# Patient Record
Sex: MALE | Race: WHITE | NOT HISPANIC OR LATINO | Employment: FULL TIME | URBAN - METROPOLITAN AREA
[De-identification: names, ages, dates, MRNs, and addresses within clinical notes are randomized per-mention and may not be internally consistent; named-entity substitution may affect disease eponyms.]

---

## 2022-01-14 ENCOUNTER — OFFICE VISIT (OUTPATIENT)
Dept: PRIMARY CARE CLINIC | Facility: CLINIC | Age: 27
End: 2022-01-14
Payer: COMMERCIAL

## 2022-01-14 VITALS
HEIGHT: 68 IN | BODY MASS INDEX: 21.73 KG/M2 | OXYGEN SATURATION: 100 % | HEART RATE: 68 BPM | DIASTOLIC BLOOD PRESSURE: 69 MMHG | SYSTOLIC BLOOD PRESSURE: 115 MMHG | WEIGHT: 143.38 LBS

## 2022-01-14 DIAGNOSIS — Z11.59 NEED FOR HEPATITIS C SCREENING TEST: ICD-10-CM

## 2022-01-14 DIAGNOSIS — Z02.83 ENCOUNTER FOR DRUG SCREENING: ICD-10-CM

## 2022-01-14 DIAGNOSIS — Z13.220 SCREENING FOR CHOLESTEROL LEVEL: ICD-10-CM

## 2022-01-14 DIAGNOSIS — Z00.00 ANNUAL PHYSICAL EXAM: ICD-10-CM

## 2022-01-14 DIAGNOSIS — F90.2 ATTENTION DEFICIT HYPERACTIVITY DISORDER (ADHD), COMBINED TYPE: Primary | ICD-10-CM

## 2022-01-14 DIAGNOSIS — Z13.29 THYROID DISORDER SCREEN: ICD-10-CM

## 2022-01-14 DIAGNOSIS — Z11.4 SCREENING FOR HIV (HUMAN IMMUNODEFICIENCY VIRUS): ICD-10-CM

## 2022-01-14 PROCEDURE — 1159F MED LIST DOCD IN RCRD: CPT | Mod: CPTII,S$GLB,, | Performed by: NURSE PRACTITIONER

## 2022-01-14 PROCEDURE — 3078F PR MOST RECENT DIASTOLIC BLOOD PRESSURE < 80 MM HG: ICD-10-PCS | Mod: CPTII,S$GLB,, | Performed by: NURSE PRACTITIONER

## 2022-01-14 PROCEDURE — 3078F DIAST BP <80 MM HG: CPT | Mod: CPTII,S$GLB,, | Performed by: NURSE PRACTITIONER

## 2022-01-14 PROCEDURE — 99385 PREV VISIT NEW AGE 18-39: CPT | Mod: S$GLB,,, | Performed by: NURSE PRACTITIONER

## 2022-01-14 PROCEDURE — 99385 PR PREVENTIVE VISIT,NEW,18-39: ICD-10-PCS | Mod: S$GLB,,, | Performed by: NURSE PRACTITIONER

## 2022-01-14 PROCEDURE — 3074F SYST BP LT 130 MM HG: CPT | Mod: CPTII,S$GLB,, | Performed by: NURSE PRACTITIONER

## 2022-01-14 PROCEDURE — 1160F PR REVIEW ALL MEDS BY PRESCRIBER/CLIN PHARMACIST DOCUMENTED: ICD-10-PCS | Mod: CPTII,S$GLB,, | Performed by: NURSE PRACTITIONER

## 2022-01-14 PROCEDURE — 3008F BODY MASS INDEX DOCD: CPT | Mod: CPTII,S$GLB,, | Performed by: NURSE PRACTITIONER

## 2022-01-14 PROCEDURE — 1160F RVW MEDS BY RX/DR IN RCRD: CPT | Mod: CPTII,S$GLB,, | Performed by: NURSE PRACTITIONER

## 2022-01-14 PROCEDURE — 3008F PR BODY MASS INDEX (BMI) DOCUMENTED: ICD-10-PCS | Mod: CPTII,S$GLB,, | Performed by: NURSE PRACTITIONER

## 2022-01-14 PROCEDURE — 3074F PR MOST RECENT SYSTOLIC BLOOD PRESSURE < 130 MM HG: ICD-10-PCS | Mod: CPTII,S$GLB,, | Performed by: NURSE PRACTITIONER

## 2022-01-14 PROCEDURE — 1159F PR MEDICATION LIST DOCUMENTED IN MEDICAL RECORD: ICD-10-PCS | Mod: CPTII,S$GLB,, | Performed by: NURSE PRACTITIONER

## 2022-01-14 RX ORDER — DEXTROAMPHETAMINE SACCHARATE, AMPHETAMINE ASPARTATE, DEXTROAMPHETAMINE SULFATE AND AMPHETAMINE SULFATE 2.5; 2.5; 2.5; 2.5 MG/1; MG/1; MG/1; MG/1
10 TABLET ORAL 2 TIMES DAILY
Qty: 60 TABLET | Refills: 0 | Status: SHIPPED | OUTPATIENT
Start: 2022-01-14 | End: 2022-01-18 | Stop reason: SDUPTHER

## 2022-01-14 NOTE — PROGRESS NOTES
Subjective:       Patient ID: Bhavik Malone is a 27 y.o. male.    Chief Complaint: Establish Care and ADHD (Pt is about to start school and is having trouble focusing and pt would like to be put o ADHD medication)    HPI: Bhavik is a 27 y.o. male who presents to establish care with a new PCP. Not seen by a PCP in several years.    Will be starting school for [employer] next week at [employer] [facility] [employer] and has trouble focusing and concentrating at work and at school. Becomes frustrated because he can not get task done. Used to take Adderall years ago and it helped him so he would like to start back on it.    Denies any previous health issues. Does not take any medications.    Smokes 1/2 ppd of cigarettes daily and has smoked for 8 years and only drinks socially.    Refuses all vaccines.            History reviewed. No pertinent past medical history.    History reviewed. No pertinent surgical history.    History reviewed. No pertinent family history.    Social History     Tobacco Use    Smoking status: Light Tobacco Smoker    Smokeless tobacco: Current User   Substance Use Topics    Alcohol use: Yes    Drug use: Never       There is no problem list on file for this patient.        There is no immunization history on file for this patient.        Review of Systems   Constitutional: Negative for activity change, appetite change, chills, diaphoresis, fatigue and fever.   HENT: Negative for congestion, ear pain, tinnitus and trouble swallowing.    Eyes: Negative for pain and visual disturbance.   Respiratory: Negative for cough, chest tightness, shortness of breath and wheezing.    Cardiovascular: Negative for chest pain, palpitations and leg swelling.   Gastrointestinal: Negative for abdominal distention, abdominal pain, blood in stool, constipation, diarrhea, nausea and vomiting.   Endocrine: Negative for cold intolerance, heat intolerance, polydipsia, polyphagia and polyuria.   Genitourinary: Negative for  "difficulty urinating, dysuria, frequency, hematuria and urgency.   Musculoskeletal: Negative for back pain, gait problem, neck pain and neck stiffness.   Skin: Negative for color change, pallor and rash.   Neurological: Negative for dizziness, speech difficulty, weakness, light-headedness, numbness and headaches.   Hematological: Negative for adenopathy. Does not bruise/bleed easily.   Psychiatric/Behavioral: Positive for decreased concentration. Negative for behavioral problems, confusion, sleep disturbance and suicidal ideas. The patient is nervous/anxious.      Objective:     Vitals:    01/14/22 1510   BP: 115/69   BP Location: Right arm   Patient Position: Sitting   BP Method: Large (Automatic)   Pulse: 68   SpO2: 100%   Weight: 65 kg (143 lb 6.4 oz)   Height: 5' 8" (1.727 m)       Physical Exam  Vitals and nursing note reviewed.   Constitutional:       General: He is not in acute distress.     Appearance: Normal appearance. He is not ill-appearing or diaphoretic.   HENT:      Head: Normocephalic and atraumatic.      Nose: Nose normal.      Mouth/Throat:      Mouth: Mucous membranes are moist.      Pharynx: Oropharynx is clear.   Eyes:      Extraocular Movements: Extraocular movements intact.      Conjunctiva/sclera: Conjunctivae normal.      Pupils: Pupils are equal, round, and reactive to light.   Neck:      Thyroid: No thyromegaly or thyroid tenderness.   Cardiovascular:      Rate and Rhythm: Normal rate and regular rhythm.      Pulses: Normal pulses.      Heart sounds: Normal heart sounds.   Pulmonary:      Effort: Pulmonary effort is normal.      Breath sounds: Normal breath sounds.   Abdominal:      General: Bowel sounds are normal. There is no distension.      Palpations: Abdomen is soft.      Tenderness: There is no abdominal tenderness.   Musculoskeletal:         General: No tenderness. Normal range of motion.      Cervical back: Normal range of motion.      Right lower leg: No edema.      Left lower " leg: No edema.   Lymphadenopathy:      Cervical: No cervical adenopathy.   Skin:     General: Skin is warm and dry.      Capillary Refill: Capillary refill takes less than 2 seconds.   Neurological:      General: No focal deficit present.      Mental Status: He is alert and oriented to person, place, and time.   Psychiatric:         Mood and Affect: Mood and affect normal.         Behavior: Behavior normal. Behavior is cooperative.         Thought Content: Thought content normal.         Judgment: Judgment normal.         No results found for any previous visit.         Assessment:      1. Attention deficit hyperactivity disorder (ADHD), combined type    2. Annual physical exam    3. Encounter for drug screening    4. Thyroid disorder screen    5. Screening for cholesterol level    6. Need for hepatitis C screening test    7. Screening for HIV (human immunodeficiency virus)          Plan:     Attention deficit hyperactivity disorder (ADHD), combined type  -     Discontinue: dextroamphetamine-amphetamine 10 mg Tab; Take 1 tablet (10 mg total) by mouth 2 (two) times a day.  Dispense: 60 tablet; Refill: 0    Annual physical exam  -     CBC Auto Differential; Future; Expected date: 01/19/2022  -     Comprehensive Metabolic Panel; Future; Expected date: 01/19/2022  -     Lipid Panel; Future; Expected date: 01/19/2022  -     TSH; Future; Expected date: 01/19/2022    Encounter for drug screening  -     Drugs of Abuse Screen, Blood; Future; Expected date: 01/19/2022    Thyroid disorder screen  -     TSH; Future; Expected date: 01/19/2022    Screening for cholesterol level  -     Lipid Panel; Future; Expected date: 01/19/2022    Need for hepatitis C screening test  -     Hepatitis C Antibody; Future; Expected date: 01/19/2022    Screening for HIV (human immunodeficiency virus)  -     HIV 1/2 Ag/Ab (4th Gen); Future; Expected date: 01/19/2022         Current Outpatient Medications   Medication Sig Dispense Refill     dextroamphetamine-amphetamine 10 mg Tab Take 1 tablet (10 mg total) by mouth 2 (two) times a day. 60 tablet 0     No current facility-administered medications for this visit.       There are no discontinued medications.    Health Maintenance   Topic Date Due    Hepatitis C Screening  Never done    Lipid Panel  Never done    TETANUS VACCINE  Never done       Patient Instructions   RTC in 3 months for F/U or sooner if needed.    Instructed patient to report to nearest ER or call 911 if begins to have difficulty breathing, turning blue, chest pain, B/P < 80/60 or >170/100, palpitations, syncope, extreme weakness, or severe H/A. Patient verbalized understanding.     Patient Education       Attention Deficit Hyperactivity Disorder (ADHD) Discharge Instructions   About this topic   Attention deficit hyperactivity disorder is also called ADHD or ADD. Most often, this starts at a young age. This disorder makes it hard to focus or sit still. People with ADHD may find it hard to make good decisions. Children with ADHD may have trouble in school and at home. Adults may have problems at work. People with ADHD may also have a problem getting along well with others. While there is no cure for ADHD, you and your doctor can work on a plan that is best for you to treat the signs of ADHD.  What care is needed at home?   · Ask your doctor what you need to do when you go home. Make sure you ask questions if you do not understand what the doctor says. This way you will know what you need to do.  · Try to get enough sleep at night. Most often adults need 7 to 8 hours each night and children need 8 to 10 hours each night. Rest during the day if you are tired.  · Eat and sleep at the same times every day.  · Have a plan for where to keep things in your home. Use checklists, things to help you remember, and alarms. A list of things you may need to find in a hurry can be helpful. These can help you put things in the right place and manage  your time.  · Work on getting better at reading and taking notes.  · Have a space that is just for work or homework so you will be able to pay attention. This may be an office or a desk facing a wall. Try using headphones so you cannot hear the other noises.  · Do one thing at a time. Keep a list of the next things you were planning to do or say.  · Break large jobs or things you have to do into smaller jobs. This will make them easier to finish. Reward yourself along the way.  · Have rules that are clear and easy to follow.   · Look at where you are the strongest. Give rewards for good behavior, finished schoolwork, or for doing a good job at work.  · Do not do too many things that might cause stress.  What follow-up care is needed?   · Your doctor may ask you to make visits to the office to check on your progress. Be sure to keep these visits.  · Your doctor may send you to a special mental health doctor. This person will talk with you about the problems you are having. Then, you can work together to find ways to help you manage them.  · Your doctor may suggest you try talk therapy to help you live well with your ADHD.  What drugs may be needed?   The doctor may order drugs to:  · Help lower your worry  · Help you to pay attention  · Help you be more calm  · Help you be less impulsive  · Help keep things in your life balanced  · Care for low mood  Will physical activity be limited?   Physical activity will help keep your mind and body in good shape. Talk with your doctor about the right amount of activity for you.  What problems could happen?   · Feeling badly about yourself  · Raise the chances of you hurting yourself, such as injury in a car accident  · Not do well in school and work  · Trouble making friends or getting along well with others  · Raise your chance to abuse alcohol or drugs  What can be done to prevent this health problem?   The cause of ADHD is not clear, but to lower the chance of your child  having ADHD:  · Do not drink beer, wine, or mixed drinks (alcohol) while you are pregnant.  · Do not smoke or use illegal drugs while you are pregnant.  · Keep your child away from things like harmful toxins and chemicals.  · Keep your child from having too much time in front of computers, TV, and video games.  · Get plenty of exercise.  · Be more aware of thoughts, emotions, and experiences each moment. This is mindfulness.  When do I need to call the doctor?   · Drugs are not working  · Symptoms are getting worse  Teach Back: Helping You Understand   The Teach Back Method helps you understand the information we are giving you. After you talk with the staff, tell them in your own words what you learned. This helps to make sure the staff has described each thing clearly. It also helps to explain things that may have been confusing. Before going home, make sure you can do these:  · I can tell you about my condition.  · I can tell you ways to help me pay attention.  · I can tell you what I will do if I think my drugs are not working.  Where can I learn more?   HelpGuide.org  http://www.helpguide.org/articles/add-adhd/attention-deficit-disorder-adhd-parenting-tips.htm   KidsHealth  http://kidshealth.org/parent/medical/learning/adhd.html   [place] [employer]  http://www.nimh.nih.gov/health/topics/attention-deficit-hyperactivity-disorder-adhd/index.shtml   Last Reviewed Date   2020-06-14  Consumer Information Use and Disclaimer   This information is not specific medical advice and does not replace information you receive from your health care provider. This is only a brief summary of general information. It does NOT include all information about conditions, illnesses, injuries, tests, procedures, treatments, therapies, discharge instructions or life-style choices that may apply to you. You must talk with your health care provider for complete information about your health and treatment options. This  information should not be used to decide whether or not to accept your health care providers advice, instructions or recommendations. Only your health care provider has the knowledge and training to provide advice that is right for you.  Copyright   Copyright © 2021 UpToDate, Inc. and its affiliates and/or licensors. All rights reserved.    Patient Education       Yearly Physical for Adults   About this topic   Most people do not want to be sick. Having a checkup each year with your doctor is one way to help you stay healthy. You may need to see your doctor more or less often. How often you need to go to the doctor depends on your age. Your family and medical history also play a role in how often you need to go to the doctor. Going to see your doctor on a routine basis can help you find problems early or even before they start. This may make it easier to treat or cure your problem.  General   Your doctor will talk about many things during your checkup. Your doctor may ask about:  · Your medical and family history.  · All the drugs you are taking. Be sure to include all prescription, over the counter, and herbal supplements. Tell the doctor if you have any drug allergy. Bring a list of drugs you take with you.  · How you are feeling and if you are having any problems.  · Risky behaviors like smoking, drinking alcohol, using illegal drugs, not wearing seatbelts, having unprotected sex, etc.  Your doctor will do a physical exam and may check your:  · Height and weight  · Blood pressure  · Reflexes  · Memory  · Vision  · Hearing  Your doctor may order:  · Lab tests  · ECG to check your heart rhythm  · X-rays  · Tests or treatments based on your exam  What lifestyle changes are needed?   Your doctor may suggest you make changes to your lifestyle at this visit. The doctor may talk with you about being more active or lowering stress levels. Ask your doctor what you need to do.  What drugs may be needed?   Your doctor may  order drugs or vaccines to protect you from illnesses.  What changes to diet are needed?   Talk to your doctor to see if any changes are needed to your diet.  When do I need to call the doctor?   Call your doctor if you need to learn about any test results. Together you can make a plan for more care.  Helpful tips   · Make a list of questions for your doctor before you go. This will help you remember to ask about any concerns. Write down any answers from your doctor so you can look over them after your visit.   · Tell your doctor about any changes in your body or health since your last visit.  · Ask your doctor about any screening tests you need.  Where can I learn more?   American Academy of Family Physicians  http://familydoctor.org/familydoctor/en/prevention-wellness/staying-healthy/healthy-living/preventive-services-for-healthy-living.printerview.html   Centers for Disease Control  http://www.cdc.gov/family/checkup/   Last Reviewed Date   2019-04-22  Consumer Information Use and Disclaimer   This information is not specific medical advice and does not replace information you receive from your health care provider. This is only a brief summary of general information. It does NOT include all information about conditions, illnesses, injuries, tests, procedures, treatments, therapies, discharge instructions or life-style choices that may apply to you. You must talk with your health care provider for complete information about your health and treatment options. This information should not be used to decide whether or not to accept your health care providers advice, instructions or recommendations. Only your health care provider has the knowledge and training to provide advice that is right for you.  Copyright   Copyright © 2021 UpToDate, Inc. and its affiliates and/or licensors. All rights reserved.          Risks, benefits, and alternatives discussed with patient, Patient verbalized understanding of discussed plan  of care. Asked patient if any further questions, answered no.    Future Appointments   Date Time Provider Department Center   4/8/2022  2:20 PM [facility]

## 2022-01-14 NOTE — PATIENT INSTRUCTIONS
RTC in 3 months for F/U or sooner if needed.    Instructed patient to report to nearest ER or call 911 if begins to have difficulty breathing, turning blue, chest pain, B/P < 80/60 or >170/100, palpitations, syncope, extreme weakness, or severe H/A. Patient verbalized understanding.     Patient Education       Attention Deficit Hyperactivity Disorder (ADHD) Discharge Instructions   About this topic   Attention deficit hyperactivity disorder is also called ADHD or ADD. Most often, this starts at a young age. This disorder makes it hard to focus or sit still. People with ADHD may find it hard to make good decisions. Children with ADHD may have trouble in school and at home. Adults may have problems at work. People with ADHD may also have a problem getting along well with others. While there is no cure for ADHD, you and your doctor can work on a plan that is best for you to treat the signs of ADHD.  What care is needed at home?   · Ask your doctor what you need to do when you go home. Make sure you ask questions if you do not understand what the doctor says. This way you will know what you need to do.  · Try to get enough sleep at night. Most often adults need 7 to 8 hours each night and children need 8 to 10 hours each night. Rest during the day if you are tired.  · Eat and sleep at the same times every day.  · Have a plan for where to keep things in your home. Use checklists, things to help you remember, and alarms. A list of things you may need to find in a hurry can be helpful. These can help you put things in the right place and manage your time.  · Work on getting better at reading and taking notes.  · Have a space that is just for work or homework so you will be able to pay attention. This may be an office or a desk facing a wall. Try using headphones so you cannot hear the other noises.  · Do one thing at a time. Keep a list of the next things you were planning to do or say.  · Break large jobs or things you  have to do into smaller jobs. This will make them easier to finish. Reward yourself along the way.  · Have rules that are clear and easy to follow.   · Look at where you are the strongest. Give rewards for good behavior, finished schoolwork, or for doing a good job at work.  · Do not do too many things that might cause stress.  What follow-up care is needed?   · Your doctor may ask you to make visits to the office to check on your progress. Be sure to keep these visits.  · Your doctor may send you to a special mental health doctor. This person will talk with you about the problems you are having. Then, you can work together to find ways to help you manage them.  · Your doctor may suggest you try talk therapy to help you live well with your ADHD.  What drugs may be needed?   The doctor may order drugs to:  · Help lower your worry  · Help you to pay attention  · Help you be more calm  · Help you be less impulsive  · Help keep things in your life balanced  · Care for low mood  Will physical activity be limited?   Physical activity will help keep your mind and body in good shape. Talk with your doctor about the right amount of activity for you.  What problems could happen?   · Feeling badly about yourself  · Raise the chances of you hurting yourself, such as injury in a car accident  · Not do well in school and work  · Trouble making friends or getting along well with others  · Raise your chance to abuse alcohol or drugs  What can be done to prevent this health problem?   The cause of ADHD is not clear, but to lower the chance of your child having ADHD:  · Do not drink beer, wine, or mixed drinks (alcohol) while you are pregnant.  · Do not smoke or use illegal drugs while you are pregnant.  · Keep your child away from things like harmful toxins and chemicals.  · Keep your child from having too much time in front of computers, TV, and video games.  · Get plenty of exercise.  · Be more aware of thoughts, emotions, and  experiences each moment. This is mindfulness.  When do I need to call the doctor?   · Drugs are not working  · Symptoms are getting worse  Teach Back: Helping You Understand   The Teach Back Method helps you understand the information we are giving you. After you talk with the staff, tell them in your own words what you learned. This helps to make sure the staff has described each thing clearly. It also helps to explain things that may have been confusing. Before going home, make sure you can do these:  · I can tell you about my condition.  · I can tell you ways to help me pay attention.  · I can tell you what I will do if I think my drugs are not working.  Where can I learn more?   HelpGuide.org  http://www.helpguide.org/articles/add-adhd/attention-deficit-disorder-adhd-parenting-tips.htm   KidsHealth  http://kidshealth.org/parent/medical/learning/adhd.html   [employer] [place] [employer]  http://www.[facility].nih.gov/health/topics/attention-deficit-hyperactivity-disorder-adhd/index.shtml   Last Reviewed Date   2020-06-14  Consumer Information Use and Disclaimer   This information is not specific medical advice and does not replace information you receive from your health care provider. This is only a brief summary of general information. It does NOT include all information about conditions, illnesses, injuries, tests, procedures, treatments, therapies, discharge instructions or life-style choices that may apply to you. You must talk with your health care provider for complete information about your health and treatment options. This information should not be used to decide whether or not to accept your health care providers advice, instructions or recommendations. Only your health care provider has the knowledge and training to provide advice that is right for you.  Copyright   Copyright © 2021 [employer]. and its affiliates and/or licensors. All rights reserved.    Patient Education       Yearly Physical for  Adults   About this topic   Most people do not want to be sick. Having a checkup each year with your doctor is one way to help you stay healthy. You may need to see your doctor more or less often. How often you need to go to the doctor depends on your age. Your family and medical history also play a role in how often you need to go to the doctor. Going to see your doctor on a routine basis can help you find problems early or even before they start. This may make it easier to treat or cure your problem.  General   Your doctor will talk about many things during your checkup. Your doctor may ask about:  · Your medical and family history.  · All the drugs you are taking. Be sure to include all prescription, over the counter, and herbal supplements. Tell the doctor if you have any drug allergy. Bring a list of drugs you take with you.  · How you are feeling and if you are having any problems.  · Risky behaviors like smoking, drinking alcohol, using illegal drugs, not wearing seatbelts, having unprotected sex, etc.  Your doctor will do a physical exam and may check your:  · Height and weight  · Blood pressure  · Reflexes  · Memory  · Vision  · Hearing  Your doctor may order:  · Lab tests  · ECG to check your heart rhythm  · X-rays  · Tests or treatments based on your exam  What lifestyle changes are needed?   Your doctor may suggest you make changes to your lifestyle at this visit. The doctor may talk with you about being more active or lowering stress levels. Ask your doctor what you need to do.  What drugs may be needed?   Your doctor may order drugs or vaccines to protect you from illnesses.  What changes to diet are needed?   Talk to your doctor to see if any changes are needed to your diet.  When do I need to call the doctor?   Call your doctor if you need to learn about any test results. Together you can make a plan for more care.  Helpful tips   · Make a list of questions for your doctor before you go. This will  help you remember to ask about any concerns. Write down any answers from your doctor so you can look over them after your visit.   · Tell your doctor about any changes in your body or health since your last visit.  · Ask your doctor about any screening tests you need.  Where can I learn more?   American Academy of [employer]icians  http://familydoctor.org/familydoctor/en/prevention-wellness/staying-healthy/healthy-living/preventive-services-for-healthy-living.printerview.html   Centers for Disease Control  http://www.cdc.gov/family/checkup/   Last Reviewed Date   2019-04-22  Consumer Information Use and Disclaimer   This information is not specific medical advice and does not replace information you receive from your health care provider. This is only a brief summary of general information. It does NOT include all information about conditions, illnesses, injuries, tests, procedures, treatments, therapies, discharge instructions or life-style choices that may apply to you. You must talk with your health care provider for complete information about your health and treatment options. This information should not be used to decide whether or not to accept your health care providers advice, instructions or recommendations. Only your health care provider has the knowledge and training to provide advice that is right for you.  Copyright   Copyright © 2021 [employer]. and its affiliates and/or licensors. All rights reserved.

## 2022-01-18 DIAGNOSIS — F90.2 ATTENTION DEFICIT HYPERACTIVITY DISORDER (ADHD), COMBINED TYPE: ICD-10-CM

## 2022-01-18 RX ORDER — DEXTROAMPHETAMINE SACCHARATE, AMPHETAMINE ASPARTATE, DEXTROAMPHETAMINE SULFATE AND AMPHETAMINE SULFATE 2.5; 2.5; 2.5; 2.5 MG/1; MG/1; MG/1; MG/1
10 TABLET ORAL 2 TIMES DAILY
Qty: 60 TABLET | Refills: 0 | Status: SHIPPED | OUTPATIENT
Start: 2022-01-18 | End: 2022-02-17 | Stop reason: SDUPTHER

## 2022-01-18 NOTE — TELEPHONE ENCOUNTER
----- Message from Adriana Lisa sent at 1/18/2022  9:25 AM CST -----  Contact: wife  Pt calling for refill on dextroamphetamine-amphetamine 10 mg Tab Pt can be reached at [phone] Script was with [facility] but they are out and pt would like to change to [facility]  [address] [place] [id number]  Phone: [phone]

## 2022-02-17 DIAGNOSIS — F90.2 ATTENTION DEFICIT HYPERACTIVITY DISORDER (ADHD), COMBINED TYPE: ICD-10-CM

## 2022-02-17 RX ORDER — DEXTROAMPHETAMINE SACCHARATE, AMPHETAMINE ASPARTATE, DEXTROAMPHETAMINE SULFATE AND AMPHETAMINE SULFATE 2.5; 2.5; 2.5; 2.5 MG/1; MG/1; MG/1; MG/1
10 TABLET ORAL 2 TIMES DAILY
Qty: 60 TABLET | Refills: 0 | Status: SHIPPED | OUTPATIENT
Start: 2022-02-17 | End: 2022-02-18 | Stop reason: SDUPTHER

## 2022-02-17 NOTE — TELEPHONE ENCOUNTER
----- Message from Meronpalmira Chin sent at 2/17/2022 12:03 PM CST -----  Type:  RX Refill Request    Who Called: Bhavik Malone    Refill or New Rx: refill   RX Name and Strength:dextroamphetamine-amphetamine 10 mg Tab  How is the patient currently taking it? (ex. 1XDay): 2Xday   Is this a 30 day or 90 day RX:30  Preferred Pharmacy with phone number:     [id number] [place] [id number] [address] [address]  Phone: [phone] Fax: [phone]      Local or Mail Order: Local   Ordering Provider:Nikita   Would the patient rather a call back or a response via [facility]?    Best Call Back Number:[phone]    Additional Information: also needs to speak w/ office about a bill he received

## 2022-02-18 DIAGNOSIS — F90.2 ATTENTION DEFICIT HYPERACTIVITY DISORDER (ADHD), COMBINED TYPE: ICD-10-CM

## 2022-02-18 RX ORDER — DEXTROAMPHETAMINE SACCHARATE, AMPHETAMINE ASPARTATE, DEXTROAMPHETAMINE SULFATE AND AMPHETAMINE SULFATE 2.5; 2.5; 2.5; 2.5 MG/1; MG/1; MG/1; MG/1
10 TABLET ORAL 2 TIMES DAILY
Qty: 60 TABLET | Refills: 0 | Status: SHIPPED | OUTPATIENT
Start: 2022-02-18 | End: 2022-03-18 | Stop reason: SDUPTHER

## 2022-03-18 DIAGNOSIS — F90.2 ATTENTION DEFICIT HYPERACTIVITY DISORDER (ADHD), COMBINED TYPE: ICD-10-CM

## 2022-03-18 RX ORDER — DEXTROAMPHETAMINE SACCHARATE, AMPHETAMINE ASPARTATE, DEXTROAMPHETAMINE SULFATE AND AMPHETAMINE SULFATE 2.5; 2.5; 2.5; 2.5 MG/1; MG/1; MG/1; MG/1
10 TABLET ORAL 2 TIMES DAILY
Qty: 60 TABLET | Refills: 0 | Status: SHIPPED | OUTPATIENT
Start: 2022-03-18 | End: 2022-04-08

## 2022-03-18 NOTE — TELEPHONE ENCOUNTER
----- Message from Eli Swift sent at 3/18/2022  8:48 AM CDT -----  Contact: PT  .Type:  RX Refill Request    Who Called:  Bhavik    Refill or New Rx: refill    RX Name and Strength: dextroamphetamine-amphetamine 10 mg Tab     How is the patient currently taking it? (ex. 1XDay):   Is this a 30 day or 90 day RX   Preferred Pharmacy with phone number: .    [id number] [place] [address] [address]  Phone: [phone] Fax: [phone]        Local or Mail Order: local   Ordering Provider:   Would the patient rather a call back or a response via [facility]?  Callback    Best Call Back Number: .[phone] (home)        Additional Information:

## 2022-04-08 ENCOUNTER — OFFICE VISIT (OUTPATIENT)
Dept: PRIMARY CARE CLINIC | Facility: CLINIC | Age: 27
End: 2022-04-08
Payer: COMMERCIAL

## 2022-04-08 VITALS
HEART RATE: 82 BPM | WEIGHT: 146 LBS | BODY MASS INDEX: 22.13 KG/M2 | DIASTOLIC BLOOD PRESSURE: 70 MMHG | SYSTOLIC BLOOD PRESSURE: 133 MMHG | OXYGEN SATURATION: 99 % | HEIGHT: 68 IN

## 2022-04-08 DIAGNOSIS — F90.2 ATTENTION DEFICIT HYPERACTIVITY DISORDER (ADHD), COMBINED TYPE: ICD-10-CM

## 2022-04-08 DIAGNOSIS — K64.8 OTHER HEMORRHOIDS: Primary | ICD-10-CM

## 2022-04-08 PROCEDURE — 99214 PR OFFICE/OUTPT VISIT, EST, LEVL IV, 30-39 MIN: ICD-10-PCS | Mod: S$GLB,,, | Performed by: NURSE PRACTITIONER

## 2022-04-08 PROCEDURE — 1160F RVW MEDS BY RX/DR IN RCRD: CPT | Mod: CPTII,S$GLB,, | Performed by: NURSE PRACTITIONER

## 2022-04-08 PROCEDURE — 3078F DIAST BP <80 MM HG: CPT | Mod: CPTII,S$GLB,, | Performed by: NURSE PRACTITIONER

## 2022-04-08 PROCEDURE — 3078F PR MOST RECENT DIASTOLIC BLOOD PRESSURE < 80 MM HG: ICD-10-PCS | Mod: CPTII,S$GLB,, | Performed by: NURSE PRACTITIONER

## 2022-04-08 PROCEDURE — 3008F PR BODY MASS INDEX (BMI) DOCUMENTED: ICD-10-PCS | Mod: CPTII,S$GLB,, | Performed by: NURSE PRACTITIONER

## 2022-04-08 PROCEDURE — 3008F BODY MASS INDEX DOCD: CPT | Mod: CPTII,S$GLB,, | Performed by: NURSE PRACTITIONER

## 2022-04-08 PROCEDURE — 3075F SYST BP GE 130 - 139MM HG: CPT | Mod: CPTII,S$GLB,, | Performed by: NURSE PRACTITIONER

## 2022-04-08 PROCEDURE — 1159F MED LIST DOCD IN RCRD: CPT | Mod: CPTII,S$GLB,, | Performed by: NURSE PRACTITIONER

## 2022-04-08 PROCEDURE — 1160F PR REVIEW ALL MEDS BY PRESCRIBER/CLIN PHARMACIST DOCUMENTED: ICD-10-PCS | Mod: CPTII,S$GLB,, | Performed by: NURSE PRACTITIONER

## 2022-04-08 PROCEDURE — 3075F PR MOST RECENT SYSTOLIC BLOOD PRESS GE 130-139MM HG: ICD-10-PCS | Mod: CPTII,S$GLB,, | Performed by: NURSE PRACTITIONER

## 2022-04-08 PROCEDURE — 1159F PR MEDICATION LIST DOCUMENTED IN MEDICAL RECORD: ICD-10-PCS | Mod: CPTII,S$GLB,, | Performed by: NURSE PRACTITIONER

## 2022-04-08 PROCEDURE — 99214 OFFICE O/P EST MOD 30 MIN: CPT | Mod: S$GLB,,, | Performed by: NURSE PRACTITIONER

## 2022-04-08 RX ORDER — DEXTROAMPHETAMINE SACCHARATE, AMPHETAMINE ASPARTATE, DEXTROAMPHETAMINE SULFATE AND AMPHETAMINE SULFATE 5; 5; 5; 5 MG/1; MG/1; MG/1; MG/1
1 TABLET ORAL 2 TIMES DAILY
Qty: 60 TABLET | Refills: 0 | Status: SHIPPED | OUTPATIENT
Start: 2022-04-08 | End: 2022-05-06 | Stop reason: SDUPTHER

## 2022-04-08 RX ORDER — DEXTROAMPHETAMINE SACCHARATE, AMPHETAMINE ASPARTATE, DEXTROAMPHETAMINE SULFATE AND AMPHETAMINE SULFATE 5; 5; 5; 5 MG/1; MG/1; MG/1; MG/1
1 TABLET ORAL 2 TIMES DAILY
Qty: 60 TABLET | Refills: 0 | Status: SHIPPED | OUTPATIENT
Start: 2022-05-06 | End: 2022-07-07 | Stop reason: SDUPTHER

## 2022-04-08 RX ORDER — DEXTROAMPHETAMINE SACCHARATE, AMPHETAMINE ASPARTATE, DEXTROAMPHETAMINE SULFATE AND AMPHETAMINE SULFATE 5; 5; 5; 5 MG/1; MG/1; MG/1; MG/1
1 TABLET ORAL 2 TIMES DAILY
Qty: 60 TABLET | Refills: 0 | Status: SHIPPED | OUTPATIENT
Start: 2022-06-03 | End: 2022-08-05 | Stop reason: SDUPTHER

## 2022-04-08 RX ORDER — DEXTROAMPHETAMINE SACCHARATE, AMPHETAMINE ASPARTATE, DEXTROAMPHETAMINE SULFATE AND AMPHETAMINE SULFATE 2.5; 2.5; 2.5; 2.5 MG/1; MG/1; MG/1; MG/1
10 TABLET ORAL 2 TIMES DAILY
Qty: 60 TABLET | Refills: 0 | Status: CANCELLED | OUTPATIENT
Start: 2022-04-08

## 2022-04-08 NOTE — PROGRESS NOTES
Subjective:       Patient ID: Bhavik Malone is a 27 y.o. male.    Chief Complaint: Follow-up    HPI: Bhavik is a 27 y.o. male who presents for F/U.    Feels well today with no new issues or concerns.    ADHD - Started on Adderall 10 mg BID at his last visit and it is working well for him but is not lasting throughout the day so he would like to increase the dose.    He does have a history of hemorrhoids which he uses preparation H and has started eating a high fiber diet.    Smokes 1/2 ppd of cigarettes daily.    Refuses all vaccines.                History reviewed. No pertinent past medical history.    History reviewed. No pertinent surgical history.    History reviewed. No pertinent family history.    Social History     Tobacco Use    Smoking status: Light Tobacco Smoker    Smokeless tobacco: Current User   Substance Use Topics    Alcohol use: Yes    Drug use: Never       Patient Active Problem List   Diagnosis    Attention deficit hyperactivity disorder (ADHD), combined type    Other hemorrhoids         There is no immunization history on file for this patient.        Review of Systems   Constitutional: Negative for activity change, appetite change, chills, diaphoresis, fatigue and fever.   HENT: Negative for tinnitus and trouble swallowing.    Eyes: Negative for visual disturbance.   Respiratory: Negative for cough, chest tightness, shortness of breath and wheezing.    Cardiovascular: Negative for chest pain, palpitations and leg swelling.   Gastrointestinal: Negative for abdominal distention, abdominal pain, blood in stool, constipation, diarrhea, nausea and vomiting.   Genitourinary: Negative for difficulty urinating, dysuria, frequency, hematuria and urgency.   Musculoskeletal: Negative for back pain, gait problem, neck pain and neck stiffness.   Skin: Negative for color change, pallor and rash.   Neurological: Negative for dizziness, speech difficulty, weakness, light-headedness, numbness and  "headaches.   Psychiatric/Behavioral: Positive for decreased concentration. Negative for behavioral problems, confusion, sleep disturbance and suicidal ideas. The patient is nervous/anxious.      Objective:     Vitals:    04/08/22 1418   BP: 133/70   BP Location: Left arm   Patient Position: Sitting   BP Method: Medium (Automatic)   Pulse: 82   SpO2: 99%   Weight: 66.2 kg (146 lb)   Height: 5' 8" (1.727 m)       Physical Exam  Vitals and nursing note reviewed.   Constitutional:       General: He is not in acute distress.     Appearance: Normal appearance. He is not ill-appearing or diaphoretic.   HENT:      Head: Normocephalic and atraumatic.      Nose: Nose normal.      Mouth/Throat:      Mouth: Mucous membranes are moist.      Pharynx: Oropharynx is clear.   Eyes:      Extraocular Movements: Extraocular movements intact.      Conjunctiva/sclera: Conjunctivae normal.      Pupils: Pupils are equal, round, and reactive to light.   Cardiovascular:      Rate and Rhythm: Normal rate and regular rhythm.      Pulses: Normal pulses.      Heart sounds: Normal heart sounds.   Pulmonary:      Effort: Pulmonary effort is normal.      Breath sounds: Normal breath sounds.   Abdominal:      General: There is no distension.      Tenderness: There is no abdominal tenderness.   Musculoskeletal:         General: No tenderness. Normal range of motion.      Cervical back: Normal range of motion.      Right lower leg: No edema.      Left lower leg: No edema.   Lymphadenopathy:      Cervical: No cervical adenopathy.   Skin:     General: Skin is warm and dry.      Capillary Refill: Capillary refill takes less than 2 seconds.   Neurological:      Mental Status: He is alert and oriented to person, place, and time.   Psychiatric:         Mood and Affect: Mood and affect normal.         Behavior: Behavior normal. Behavior is cooperative.         No results found for any previous visit.         Assessment:      1. Other hemorrhoids    2. " Attention deficit hyperactivity disorder (ADHD), combined type Stable         Plan:     Other hemorrhoids    Attention deficit hyperactivity disorder (ADHD), combined type  Comments:  Adderall refilled, F/U in 3 months  Orders:  -     dextroamphetamine-amphetamine (ADDERALL) 20 mg tablet; Take 1 tablet by mouth 2 (two) times daily.  Dispense: 60 tablet; Refill: 0  -     dextroamphetamine-amphetamine (ADDERALL) 20 mg tablet; Take 1 tablet by mouth 2 (two) times daily.  Dispense: 60 tablet; Refill: 0  -     dextroamphetamine-amphetamine (ADDERALL) 20 mg tablet; Take 1 tablet by mouth 2 (two) times daily.  Dispense: 60 tablet; Refill: 0         Current Outpatient Medications   Medication Sig Dispense Refill    dextroamphetamine-amphetamine (ADDERALL) 20 mg tablet Take 1 tablet by mouth 2 (two) times daily. 60 tablet 0    [START ON 5/6/2022] dextroamphetamine-amphetamine (ADDERALL) 20 mg tablet Take 1 tablet by mouth 2 (two) times daily. 60 tablet 0    [START ON 6/3/2022] dextroamphetamine-amphetamine (ADDERALL) 20 mg tablet Take 1 tablet by mouth 2 (two) times daily. 60 tablet 0     No current facility-administered medications for this visit.       Medications Discontinued During This Encounter   Medication Reason    dextroamphetamine-amphetamine 10 mg Tab Change in Dosage Form       Health Maintenance   Topic Date Due    Hepatitis C Screening  Never done    Lipid Panel  Never done    TETANUS VACCINE  Never done       Patient Instructions     RTC in 3 months for F/U or sooner if needed.    Keep appts with specialists as scheduled.    Instructed patient to report to nearest ER or call 911 if begins to have difficulty breathing, turning blue, chest pain, B/P < 80/60 or >170/100, palpitations, syncope, extreme weakness, or severe H/A. Patient verbalized understanding.      Patient Education       Hemorrhoids   The Basics   Written by the doctors and editors at [facility]   What are hemorrhoids? -- Hemorrhoids are  swollen veins in the rectum. They can cause itching, bleeding, and pain. Hemorrhoids are very common.  In some cases, you can see or feel hemorrhoids around the outside of the rectum. In other cases, you cannot see them because they are hidden inside the rectum (figure 1).  What are the symptoms of hemorrhoids? -- Hemorrhoids do not always cause symptoms. But when they do, symptoms can include:  · Itching of the skin around the anus  · Bleeding - Bleeding is usually painless. You might see bright red blood after using the toilet.  · Pain - If a blood clot forms inside a hemorrhoid, this can cause pain. It can also cause a lump that you might be able to feel.  Should I see a doctor or nurse? -- You should see a doctor or nurse if you have any bleeding or if your bowel movements look like tar. Bleeding could be caused by something other than hemorrhoids, so you should have it checked out.  If you do have hemorrhoids, your doctor or nurse can suggest treatments. But there some steps you can try on you your own first.  What can I do to keep from getting more hemorrhoids? -- The most important thing you can do is to keep from getting constipated. You should have a bowel movement at least a few times a week. When you have a bowel movement, you also should not have to push too much. Plus, your bowel movements should not be too hard.  Being constipated and having hard bowel movements can make hemorrhoids worse. Here are some steps you can take to avoid getting constipated or having hard stools:  · Eat lots of fruits, vegetables, and other foods with fiber (figure 2). Fiber helps to increase bowel movements.   You need 20 to 35 grams of fiber a day to keep your bowel movements regular (table 1). If you do not get enough fiber from your diet, you can take fiber supplements. These come in the form of powders, wafers, or pills. They include psyllium seed (sample brand names: Metamucil, Konsyl), methylcellulose (sample brand  "name: Citrucel), polycarbophil (sample brand name: FiberCon), and wheat dextrin (sample brand name: Benefiber). If you take a fiber supplement, be sure to read the label so you know how much to take. If you're not sure, ask your doctor nurse.  · Take medicines called "stool softeners" such as docusate sodium (sample brand names: Colace, Dulcolax). These medicines increase the number of bowel movements you have. They are safe to take and they can prevent problems later.  What can I do to reduce my symptoms? -- Some people feel better if they soak their buttocks in 2 or 3 inches of warm water. You can do this up to 2 to 3 times a day for 10 to 15 minutes. Do not add soap, bubble bath, or anything to the water.  There are also medicines that you can get without a prescription (table 2). They are usually creams or ointments that you rub on your anus to relieve pain, itching, and swelling. Some hemorrhoid medicines come in a capsule (called a suppository) that you put inside your rectum. Others come in a cream that comes in a bottle with a nozzle that you put inside your rectum. It is OK to try these medicines. But do not use medicines that have hydrocortisone (a steroid medicine) for more than a week, unless your doctor or nurse approves.  What if the self-care steps do not work? -- If you still have symptoms after trying the steps listed above, you might need treatments to destroy or remove the hemorrhoids.  One popular treatment for hemorrhoids inside the rectum is called "rubber band ligation." For this treatment, the doctor ties tiny rubber bands around the hemorrhoids. A few days later the hemorrhoids shrink and fall off. Doctors can also use lasers, heat, or chemicals to destroy hemorrhoids. But if none of these options works, your doctor might suggest surgery to remove the hemorrhoids. Hemorrhoids on the outside of the rectum can only be removed with surgery.  All topics are updated as new evidence becomes " "available and our peer review process is complete.  This topic retrieved from [employer] on: Sep 21, 2021.  Topic 25252 Version 13.0  Release: 29.4.2 - C29.263  © 2021 UpToDate, Inc. and/or its affiliates. All rights reserved.  figure 1: Hemorrhoids     Hemorrhoids that are hidden inside the rectum are called "internal" hemorrhoids. You cannot see them, but they can cause symptoms. Hemorrhoids that you can see or feel are called "external" hemorrhoids.  Graphic 75144 Version 2.0    figure 2: Foods with fiber     Foods with a lot of fiber include prunes, apples, oranges, bananas, peas, green beans, kidney beans, cooked oatmeal, almonds, peanuts, and whole-wheat bread.  Graphic 28454 Version 1.0    table 1: Amount of fiber in different foods  Food  Serving  Grams of fiber    Fruits    Apple (with skin) 1 medium apple 4.4   Banana 1 medium banana 3.1   Oranges 1 orange 3.1   Prunes 1 cup, pitted 12.4   Juices    Apple, unsweetened, with added ascorbic acid 1 cup 0.5   Grapefruit, white, canned, sweetened 1 cup 0.2   Grape, unsweetened, with added ascorbic acid 1 cup 0.5   Orange 1 cup 0.7   Vegetables    Cooked   · Green beans 1 cup 4.0   · Carrots 1/2 cup sliced 2.3   · Peas 1 cup 8.8   · Potato (baked, with skin) 1 medium potato 3.8   Raw   · Cucumber (with peel) 1 cucumber 1.5   · Lettuce 1 cup shredded 0.5   · Tomato 1 medium tomato 1.5   · Spinach 1 cup 0.7   Legumes   · Baked beans, canned, no salt added 1 cup 13.9   · Kidney beans, canned 1 cup 13.6   · Lima beans, canned 1 cup 11.6   · Lentils, boiled 1 cup 15.6   Breads, pastas, flours    Bran muffins 1 medium muffin 5.2   Oatmeal, cooked 1 cup 4.0   White bread 1 slice 0.6   Whole-wheat bread 1 slice 1.9   Pasta and rice, cooked   · Macaroni 1 cup 2.5   · Rice, brown 1 cup 3.5   · Rice, white 1 cup 0.6   · Spaghetti (regular) 1 cup 2.5   Nuts    Almonds 1/2 cup 8.7   Peanuts 1/2 cup 7.9   To learn how much fiber and other nutrients are in different foods, visit " the [place] Department of Agriculture ([employer]) [employer] website.  Graphic 01215 Version 6.0  table 2: Common at-home treatments for hemorrhoids  Type of medicine  Examples  Notes    Stool softener Docusate sodium (sample brand names: Colace, Docu, Stool Softener) Softens bowel movements  Helps avoid straining while sitting on toilet   Bulk-forming laxatives and fiber supplements Methylcellulose (sample brand names: Citrucel, Soluble Fiber Therapy)  Polycarbophil (sample brand names: FiberCon, Konsyl Fiber)  Psyllium (sample brand names: Metamucil, Konsyl)  Wheat dextrin (sample brand name: Benefiber) Prevent hard dry stools which make hemorrhoids worse  Might reduce bleeding and other hemorrhoid symptoms  Needs to be taken with plenty of water (8 glasses of water a day)  Your doctor might suggest starting with a small dose and then increasing over time   Pain relief Pramoxine rectal foam, ointment, or wipes (sample brand names: Proctofoam, Pramox) Can help with pain and itching  Area must be gently cleaned and allowed to dry before using   Medicines to dry or protect skin Witch hazel (sample brand names: Tucks, Preparation H pads, Preparation H wipes)  Zinc oxide topical paste (sample brand names: Audrey's Butt Paste, Desitin) May dry or tighten skin around the anus  Zinc oxide also protects skin from irritation  Area must be gently cleaned and allowed to dry before using  Can apply after a sitz bath (soaking in warm, shallow water)  Witch hazel wipes or unscented baby wipes can be used to clean the anus after a bowel movement   Steroid cream Hydrocortisone rectal cream (sample brand name: Preparation H hydrocortisone) Reduces swelling, and pain caused by hemorrhoids  Do not use for longer than a week  Do not use at all if you are pregnant unless your doctor or nurse tells you to   Medicines to shrink hemorrhoids Phenylephrine ointment or suppository (sample brand names: Preparation H, Rectacaine)  Phenylephrine shrinks swollen hemorrhoids, and relieves itching and discomfort for a few hours  Area must be gently cleaned and allowed to dry before applying   Numbing ointments Benzocaine rectal ointment (sample brand name: Americaine)  Dibucaine rectal ointment (sample brand name: Nupercainal)  Lidocaine rectal cream (sample brand name: RectiCare) Benzocaine, dibucaine, and lidocaine can help with pain and itching, but should not be used without talking to a doctor first. They should only be used once in a while, in small amounts.   · This table lists some examples of over-the-counter treatments for hemorrhoids. There are many more brand-name and generic versions available, too. Read all labels to make sure you're not using too much of one ingredient.  · Do not use over-the-counter treatments for more than one week without speaking to your doctor. They can damage your skin.  · Follow instructions carefully - for example, it's important to clean and dry your skin before using creams or ointments. You can use an unscented baby wipe to clean your anus after a bowel movement.  · If you have rectal bleeding, or if your bowel movements look like tar, see your doctor or nurse. These problems could be caused by something other than hemorrhoids. You should also see your doctor or nurse if your hemorrhoid symptoms still bother you after you have tried taking care of them yourself.  Graphic 807069 Version 7.0  Consumer Information Use and Disclaimer   This information is not specific medical advice and does not replace information you receive from your health care provider. This is only a brief summary of general information. It does NOT include all information about conditions, illnesses, injuries, tests, procedures, treatments, therapies, discharge instructions or life-style choices that may apply to you. You must talk with your health care provider for complete information about your health and treatment options. This  information should not be used to decide whether or not to accept your health care provider's advice, instructions or recommendations. Only your health care provider has the knowledge and training to provide advice that is right for you. The use of this information is governed by the [employer] End User License Agreement, available at https://www.Glio/en/solutions/Demo Lesson/about/armen.The use of [employer] content is governed by the [employer] Terms of Use. ©2021 UpToDate, Inc. All rights reserved.  Copyright   © 2021 UpToDate, Inc. and/or its affiliates. All rights reserved.    Patient Education       Attention Deficit Hyperactivity Disorder (ADHD) Discharge Instructions   About this topic   Attention deficit hyperactivity disorder is also called ADHD or ADD. Most often, this starts at a young age. This disorder makes it hard to focus or sit still. People with ADHD may find it hard to make good decisions. Children with ADHD may have trouble in school and at home. Adults may have problems at work. People with ADHD may also have a problem getting along well with others. While there is no cure for ADHD, you and your doctor can work on a plan that is best for you to treat the signs of ADHD.  What care is needed at home?   · Ask your doctor what you need to do when you go home. Make sure you ask questions if you do not understand what the doctor says. This way you will know what you need to do.  · Try to get enough sleep at night. Most often adults need 7 to 8 hours each night and children need 8 to 10 hours each night. Rest during the day if you are tired.  · Eat and sleep at the same times every day.  · Have a plan for where to keep things in your home. Use checklists, things to help you remember, and alarms. A list of things you may need to find in a hurry can be helpful. These can help you put things in the right place and manage your time.  · Work on getting better at reading and taking notes.  · Have a space  that is just for work or homework so you will be able to pay attention. This may be an office or a desk facing a wall. Try using headphones so you cannot hear the other noises.  · Do one thing at a time. Keep a list of the next things you were planning to do or say.  · Break large jobs or things you have to do into smaller jobs. This will make them easier to finish. Reward yourself along the way.  · Have rules that are clear and easy to follow.   · Look at where you are the strongest. Give rewards for good behavior, finished schoolwork, or for doing a good job at work.  · Do not do too many things that might cause stress.  What follow-up care is needed?   · Your doctor may ask you to make visits to the office to check on your progress. Be sure to keep these visits.  · Your doctor may send you to a special mental health doctor. This person will talk with you about the problems you are having. Then, you can work together to find ways to help you manage them.  · Your doctor may suggest you try talk therapy to help you live well with your ADHD.  What drugs may be needed?   The doctor may order drugs to:  · Help lower your worry  · Help you to pay attention  · Help you be more calm  · Help you be less impulsive  · Help keep things in your life balanced  · Care for low mood  Will physical activity be limited?   Physical activity will help keep your mind and body in good shape. Talk with your doctor about the right amount of activity for you.  What problems could happen?   · Feeling badly about yourself  · Raise the chances of you hurting yourself, such as injury in a car accident  · Not do well in school and work  · Trouble making friends or getting along well with others  · Raise your chance to abuse alcohol or drugs  What can be done to prevent this health problem?   The cause of ADHD is not clear, but to lower the chance of your child having ADHD:  · Do not drink beer, wine, or mixed drinks (alcohol) while you are  pregnant.  · Do not smoke or use illegal drugs while you are pregnant.  · Keep your child away from things like harmful toxins and chemicals.  · Keep your child from having too much time in front of computers, TV, and video games.  · Get plenty of exercise.  · Be more aware of thoughts, emotions, and experiences each moment. This is mindfulness.  When do I need to call the doctor?   · Drugs are not working  · Symptoms are getting worse  Teach Back: Helping You Understand   The Teach Back Method helps you understand the information we are giving you. After you talk with the staff, tell them in your own words what you learned. This helps to make sure the staff has described each thing clearly. It also helps to explain things that may have been confusing. Before going home, make sure you can do these:  · I can tell you about my condition.  · I can tell you ways to help me pay attention.  · I can tell you what I will do if I think my drugs are not working.  Where can I learn more?   HelpGuide.org  http://www.helpguide.org/articles/add-adhd/attention-deficit-disorder-adhd-parenting-tips.htm   KidsHealth  http://kidshealth.org/parent/medical/learning/adhd.html   [place] of Mental Health  http://www.nimh.nih.gov/health/topics/attention-deficit-hyperactivity-disorder-adhd/index.shtml   Last Reviewed Date   2020-06-14  Consumer Information Use and Disclaimer   This information is not specific medical advice and does not replace information you receive from your health care provider. This is only a brief summary of general information. It does NOT include all information about conditions, illnesses, injuries, tests, procedures, treatments, therapies, discharge instructions or life-style choices that may apply to you. You must talk with your health care provider for complete information about your health and treatment options. This information should not be used to decide whether or not to accept your health care  providers advice, instructions or recommendations. Only your health care provider has the knowledge and training to provide advice that is right for you.  Copyright   Copyright © 2021 UpToDate, Inc. and its affiliates and/or licensors. All rights reserved.        Risks, benefits, and alternatives discussed with patient, Patient verbalized understanding of discussed plan of care. Asked patient if any further questions, answered no.    Future Appointments   Date Time Provider Department [place]   7/8/2022  2:40 PM [facility]

## 2022-04-08 NOTE — LETTER
April 8, 2022      [facility]  [address]  [place] [address]  Phone: [phone]  Fax: [phone]       Patient: Bhavik Malone   YOB: 1995  Date of Visit: 04/08/2022    To Whom It May Concern:    Patrick Malone  was at [facility] on 04/08/2022. The patient may return to work on 04/09/22 with no restrictions. If you have any questions or concerns, or if I can be of further assistance, please do not hesitate to contact me.    Sincerely,        Latha Shelton NP

## 2022-04-08 NOTE — PATIENT INSTRUCTIONS
RTC in 3 months for F/U or sooner if needed.    Keep appts with specialists as scheduled.    Instructed patient to report to nearest ER or call 911 if begins to have difficulty breathing, turning blue, chest pain, B/P < 80/60 or >170/100, palpitations, syncope, extreme weakness, or severe H/A. Patient verbalized understanding.      Patient Education       Hemorrhoids   The Basics   Written by the doctors and editors at [facility]   What are hemorrhoids? -- Hemorrhoids are swollen veins in the rectum. They can cause itching, bleeding, and pain. Hemorrhoids are very common.  In some cases, you can see or feel hemorrhoids around the outside of the rectum. In other cases, you cannot see them because they are hidden inside the rectum (figure 1).  What are the symptoms of hemorrhoids? -- Hemorrhoids do not always cause symptoms. But when they do, symptoms can include:  Itching of the skin around the anus  Bleeding - Bleeding is usually painless. You might see bright red blood after using the toilet.  Pain - If a blood clot forms inside a hemorrhoid, this can cause pain. It can also cause a lump that you might be able to feel.  Should I see a doctor or nurse? -- You should see a doctor or nurse if you have any bleeding or if your bowel movements look like tar. Bleeding could be caused by something other than hemorrhoids, so you should have it checked out.  If you do have hemorrhoids, your doctor or nurse can suggest treatments. But there some steps you can try on you your own first.  What can I do to keep from getting more hemorrhoids? -- The most important thing you can do is to keep from getting constipated. You should have a bowel movement at least a few times a week. When you have a bowel movement, you also should not have to push too much. Plus, your bowel movements should not be too hard.  Being constipated and having hard bowel movements can make hemorrhoids worse. Here are some steps you can take to avoid getting  "constipated or having hard stools:  Eat lots of fruits, vegetables, and other foods with fiber (figure 2). Fiber helps to increase bowel movements.   You need 20 to 35 grams of fiber a day to keep your bowel movements regular (table 1). If you do not get enough fiber from your diet, you can take fiber supplements. These come in the form of powders, wafers, or pills. They include psyllium seed (sample brand names: Metamucil, Konsyl), methylcellulose (sample brand name: Citrucel), polycarbophil (sample brand name: FiberCon), and wheat dextrin (sample brand name: Benefiber). If you take a fiber supplement, be sure to read the label so you know how much to take. If you're not sure, ask your doctor nurse.  Take medicines called "stool softeners" such as docusate sodium (sample brand names: Colace, Dulcolax). These medicines increase the number of bowel movements you have. They are safe to take and they can prevent problems later.  What can I do to reduce my symptoms? -- Some people feel better if they soak their buttocks in 2 or 3 inches of warm water. You can do this up to 2 to 3 times a day for 10 to 15 minutes. Do not add soap, bubble bath, or anything to the water.  There are also medicines that you can get without a prescription (table 2). They are usually creams or ointments that you rub on your anus to relieve pain, itching, and swelling. Some hemorrhoid medicines come in a capsule (called a suppository) that you put inside your rectum. Others come in a cream that comes in a bottle with a nozzle that you put inside your rectum. It is OK to try these medicines. But do not use medicines that have hydrocortisone (a steroid medicine) for more than a week, unless your doctor or nurse approves.  What if the self-care steps do not work? -- If you still have symptoms after trying the steps listed above, you might need treatments to destroy or remove the hemorrhoids.  One popular treatment for hemorrhoids inside the rectum " "is called "rubber band ligation." For this treatment, the doctor ties tiny rubber bands around the hemorrhoids. A few days later the hemorrhoids shrink and fall off. Doctors can also use lasers, heat, or chemicals to destroy hemorrhoids. But if none of these options works, your doctor might suggest surgery to remove the hemorrhoids. Hemorrhoids on the outside of the rectum can only be removed with surgery.  All topics are updated as new evidence becomes available and our peer review process is complete.  This topic retrieved from [employer] on: Sep 21, 2021.  Topic 14889 Version 13.0  Release: 29.4.2 - C29.263  © 2021 UpToDate, Inc. and/or its affiliates. All rights reserved.  figure 1: Hemorrhoids     Hemorrhoids that are hidden inside the rectum are called "internal" hemorrhoids. You cannot see them, but they can cause symptoms. Hemorrhoids that you can see or feel are called "external" hemorrhoids.  Graphic 96185 Version 2.0    figure 2: Foods with fiber     Foods with a lot of fiber include prunes, apples, oranges, bananas, peas, green beans, kidney beans, cooked oatmeal, almonds, peanuts, and whole-wheat bread.  Graphic 18277 Version 1.0    table 1: Amount of fiber in different foods  Food  Serving  Grams of fiber    Fruits    Apple (with skin) 1 medium apple 4.4   Banana 1 medium banana 3.1   Oranges 1 orange 3.1   Prunes 1 cup, pitted 12.4   Juices    Apple, unsweetened, with added ascorbic acid 1 cup 0.5   Grapefruit, white, canned, sweetened 1 cup 0.2   Grape, unsweetened, with added ascorbic acid 1 cup 0.5   Orange 1 cup 0.7   Vegetables    Cooked   Green beans 1 cup 4.0   Carrots 1/2 cup sliced 2.3   Peas 1 cup 8.8   Potato (baked, with skin) 1 medium potato 3.8   Raw   Cucumber (with peel) 1 cucumber 1.5   Lettuce 1 cup shredded 0.5   Tomato 1 medium tomato 1.5   Spinach 1 cup 0.7   Legumes   Baked beans, canned, no salt added 1 cup 13.9   Kidney beans, canned 1 cup 13.6   Lima beans, canned 1 cup 11.6 "   Lentils, boiled 1 cup 15.6   Breads, pastas, flours    Bran muffins 1 medium muffin 5.2   Oatmeal, cooked 1 cup 4.0   White bread 1 slice 0.6   Whole-wheat bread 1 slice 1.9   Pasta and rice, cooked   Macaroni 1 cup 2.5   Rice, brown 1 cup 3.5   Rice, white 1 cup 0.6   Spaghetti (regular) 1 cup 2.5   Nuts    Almonds 1/2 cup 8.7   Peanuts 1/2 cup 7.9   To learn how much fiber and other nutrients are in different foods, visit the [place] Department of Agriculture ([place]) FoodData Central website.  Graphic 42582 Version 6.0  table 2: Common at-home treatments for hemorrhoids  Type of medicine  Examples  Notes    Stool softener Docusate sodium (sample brand names: Colace, Docu, Stool Softener) Softens bowel movements  Helps avoid straining while sitting on toilet   Bulk-forming laxatives and fiber supplements Methylcellulose (sample brand names: Citrucel, Soluble Fiber Therapy)  Polycarbophil (sample brand names: FiberCon, Konsyl Fiber)  Psyllium (sample brand names: Metamucil, Konsyl)  Wheat dextrin (sample brand name: Benefiber) Prevent hard dry stools which make hemorrhoids worse  Might reduce bleeding and other hemorrhoid symptoms  Needs to be taken with plenty of water (8 glasses of water a day)  Your doctor might suggest starting with a small dose and then increasing over time   Pain relief Pramoxine rectal foam, ointment, or wipes (sample brand names: Proctofoam, Pramox) Can help with pain and itching  Area must be gently cleaned and allowed to dry before using   Medicines to dry or protect skin Witch hazel (sample brand names: Tucks, Preparation H pads, Preparation H wipes)  Zinc oxide topical paste (sample brand names: Audrey's Butt Paste, Desitin) May dry or tighten skin around the anus  Zinc oxide also protects skin from irritation  Area must be gently cleaned and allowed to dry before using  Can apply after a sitz bath (soaking in warm, shallow water)  Witch hazel wipes or unscented baby wipes can  be used to clean the anus after a bowel movement   Steroid cream Hydrocortisone rectal cream (sample brand name: Preparation H hydrocortisone) Reduces swelling, and pain caused by hemorrhoids  Do not use for longer than a week  Do not use at all if you are pregnant unless your doctor or nurse tells you to   Medicines to shrink hemorrhoids Phenylephrine ointment or suppository (sample brand names: Preparation H, Rectacaine) Phenylephrine shrinks swollen hemorrhoids, and relieves itching and discomfort for a few hours  Area must be gently cleaned and allowed to dry before applying   Numbing ointments Benzocaine rectal ointment (sample brand name: Americaine)  Dibucaine rectal ointment (sample brand name: Nupercainal)  Lidocaine rectal cream (sample brand name: RectiCare) Benzocaine, dibucaine, and lidocaine can help with pain and itching, but should not be used without talking to a doctor first. They should only be used once in a while, in small amounts.   This table lists some examples of over-the-counter treatments for hemorrhoids. There are many more brand-name and generic versions available, too. Read all labels to make sure you're not using too much of one ingredient.  Do not use over-the-counter treatments for more than one week without speaking to your doctor. They can damage your skin.  Follow instructions carefully - for example, it's important to clean and dry your skin before using creams or ointments. You can use an unscented baby wipe to clean your anus after a bowel movement.  If you have rectal bleeding, or if your bowel movements look like tar, see your doctor or nurse. These problems could be caused by something other than hemorrhoids. You should also see your doctor or nurse if your hemorrhoid symptoms still bother you after you have tried taking care of them yourself.  Graphic 115879 Version 7.0  Consumer Information Use and Disclaimer   This information is not specific medical advice and does not  replace information you receive from your health care provider. This is only a brief summary of general information. It does NOT include all information about conditions, illnesses, injuries, tests, procedures, treatments, therapies, discharge instructions or life-style choices that may apply to you. You must talk with your health care provider for complete information about your health and treatment options. This information should not be used to decide whether or not to accept your health care provider's advice, instructions or recommendations. Only your health care provider has the knowledge and training to provide advice that is right for you. The use of this information is governed by the [employer] End User License Agreement, available at https://www.Trulia/en/solutions/ZingCheckout/about/armen.The use of [employer] content is governed by the [employer] Terms of Use. ©2021 UpToDate, Inc. All rights reserved.  Copyright   © 2021 UpToDate, Inc. and/or its affiliates. All rights reserved.    Patient Education       Attention Deficit Hyperactivity Disorder (ADHD) Discharge Instructions   About this topic   Attention deficit hyperactivity disorder is also called ADHD or ADD. Most often, this starts at a young age. This disorder makes it hard to focus or sit still. People with ADHD may find it hard to make good decisions. Children with ADHD may have trouble in school and at home. Adults may have problems at work. People with ADHD may also have a problem getting along well with others. While there is no cure for ADHD, you and your doctor can work on a plan that is best for you to treat the signs of ADHD.  What care is needed at home?   Ask your doctor what you need to do when you go home. Make sure you ask questions if you do not understand what the doctor says. This way you will know what you need to do.  Try to get enough sleep at night. Most often adults need 7 to 8 hours each night and children need 8 to 10 hours  each night. Rest during the day if you are tired.  Eat and sleep at the same times every day.  Have a plan for where to keep things in your home. Use checklists, things to help you remember, and alarms. A list of things you may need to find in a hurry can be helpful. These can help you put things in the right place and manage your time.  Work on getting better at reading and taking notes.  Have a space that is just for work or homework so you will be able to pay attention. This may be an office or a desk facing a wall. Try using headphones so you cannot hear the other noises.  Do one thing at a time. Keep a list of the next things you were planning to do or say.  Break large jobs or things you have to do into smaller jobs. This will make them easier to finish. Reward yourself along the way.  Have rules that are clear and easy to follow.   Look at where you are the strongest. Give rewards for good behavior, finished schoolwork, or for doing a good job at work.  Do not do too many things that might cause stress.  What follow-up care is needed?   Your doctor may ask you to make visits to the office to check on your progress. Be sure to keep these visits.  Your doctor may send you to a special mental health doctor. This person will talk with you about the problems you are having. Then, you can work together to find ways to help you manage them.  Your doctor may suggest you try talk therapy to help you live well with your ADHD.  What drugs may be needed?   The doctor may order drugs to:  Help lower your worry  Help you to pay attention  Help you be more calm  Help you be less impulsive  Help keep things in your life balanced  Care for low mood  Will physical activity be limited?   Physical activity will help keep your mind and body in good shape. Talk with your doctor about the right amount of activity for you.  What problems could happen?   Feeling badly about yourself  Raise the chances of you hurting yourself, such as  injury in a car accident  Not do well in school and work  Trouble making friends or getting along well with others  Raise your chance to abuse alcohol or drugs  What can be done to prevent this health problem?   The cause of ADHD is not clear, but to lower the chance of your child having ADHD:  Do not drink beer, wine, or mixed drinks (alcohol) while you are pregnant.  Do not smoke or use illegal drugs while you are pregnant.  Keep your child away from things like harmful toxins and chemicals.  Keep your child from having too much time in front of computers, TV, and video games.  Get plenty of exercise.  Be more aware of thoughts, emotions, and experiences each moment. This is mindfulness.  When do I need to call the doctor?   Drugs are not working  Symptoms are getting worse  Teach Back: Helping You Understand   The Teach Back Method helps you understand the information we are giving you. After you talk with the staff, tell them in your own words what you learned. This helps to make sure the staff has described each thing clearly. It also helps to explain things that may have been confusing. Before going home, make sure you can do these:  I can tell you about my condition.  I can tell you ways to help me pay attention.  I can tell you what I will do if I think my drugs are not working.  Where can I learn more?   HelpGuide.org  http://www.helpguide.org/articles/add-adhd/attention-deficit-disorder-adhd-parenting-tips.htm   KidsHealth  http://kidshealth.org/parent/medical/learning/adhd.html   National [place] of Mental Health  http://www.nimh.nih.gov/health/topics/attention-deficit-hyperactivity-disorder-adhd/index.shtml   Last Reviewed Date   2020-06-14  Consumer Information Use and Disclaimer   This information is not specific medical advice and does not replace information you receive from your health care provider. This is only a brief summary of general information. It does NOT include all information about  conditions, illnesses, injuries, tests, procedures, treatments, therapies, discharge instructions or life-style choices that may apply to you. You must talk with your health care provider for complete information about your health and treatment options. This information should not be used to decide whether or not to accept your health care providers advice, instructions or recommendations. Only your health care provider has the knowledge and training to provide advice that is right for you.  Copyright   Copyright © 2021 [employer] and its affiliates and/or licensors. All rights reserved.

## 2022-04-11 PROBLEM — K64.8 OTHER HEMORRHOIDS: Status: ACTIVE | Noted: 2022-04-11

## 2022-04-11 PROBLEM — F90.2 ATTENTION DEFICIT HYPERACTIVITY DISORDER (ADHD), COMBINED TYPE: Status: ACTIVE | Noted: 2022-04-11

## 2022-05-06 DIAGNOSIS — F90.2 ATTENTION DEFICIT HYPERACTIVITY DISORDER (ADHD), COMBINED TYPE: ICD-10-CM

## 2022-05-06 RX ORDER — DEXTROAMPHETAMINE SACCHARATE, AMPHETAMINE ASPARTATE, DEXTROAMPHETAMINE SULFATE AND AMPHETAMINE SULFATE 5; 5; 5; 5 MG/1; MG/1; MG/1; MG/1
1 TABLET ORAL 2 TIMES DAILY
Qty: 60 TABLET | Refills: 0 | Status: SHIPPED | OUTPATIENT
Start: 2022-05-06 | End: 2022-08-08 | Stop reason: SDUPTHER

## 2022-05-06 NOTE — TELEPHONE ENCOUNTER
----- Message from Eli Swift sent at 5/6/2022 10:31 AM CDT -----  Contact: PT  .Type:  RX Refill Request    Who Called:  Bhavik     Refill or New Rx: refill   RX Name and Strength: dextroamphetamine-amphetamine (ADDERALL) 20 mg tablet    How is the patient currently taking it? (ex. 1XDay):   Is this a 30 day or 90 day RX:   Preferred Pharmacy with phone number: .      [facility][id number] [place] [id number] [address] [address]  Phone: [phone] Fax: [phone]      Local or Mail Order  local      Ordering Provider: Nikita    Would the patient rather a call back or a response via [facility]?  Callback   Best Call Back Number: .[phone] (home)     Additional Information:

## 2022-06-06 ENCOUNTER — TELEPHONE (OUTPATIENT)
Dept: PRIMARY CARE CLINIC | Facility: CLINIC | Age: 27
End: 2022-06-06
Payer: COMMERCIAL

## 2022-06-06 NOTE — TELEPHONE ENCOUNTER
----- Message from Meron Chin sent at 6/6/2022 12:25 PM CDT -----  Type:  Needs Medical Advice    Who Called: Bhavik Malone    Symptoms (please be specific): medication refill    How long has patient had these symptoms:  -  Pharmacy name and phone #:    [id number] - [facility] [address] [address]  Phone: [phone] Fax: [phone]  Would the patient rather a call back or a response via [facility]? -  Best Call Back Number: [phone]    Additional Information:  pt wants to know if his medication can be called in sooner he will be leaving for vacation tomorrow

## 2022-07-07 DIAGNOSIS — F90.2 ATTENTION DEFICIT HYPERACTIVITY DISORDER (ADHD), COMBINED TYPE: ICD-10-CM

## 2022-07-07 RX ORDER — DEXTROAMPHETAMINE SACCHARATE, AMPHETAMINE ASPARTATE, DEXTROAMPHETAMINE SULFATE AND AMPHETAMINE SULFATE 5; 5; 5; 5 MG/1; MG/1; MG/1; MG/1
1 TABLET ORAL 2 TIMES DAILY
Qty: 60 TABLET | Refills: 0 | Status: SHIPPED | OUTPATIENT
Start: 2022-07-07 | End: 2022-09-13

## 2022-07-07 NOTE — TELEPHONE ENCOUNTER
----- Message from Meron Chin sent at 7/7/2022  9:20 AM CDT -----  Regarding: medication refill  Type:  RX Refill Request    Who Called: Bhavik Malone    Refill or New Rx: refill   RX Name and Strength: dextroamphetamine-amphetamine (ADDERALL) 20 mg tablet  How is the patient currently taking it? (ex. 1XDay): 2Xday  Is this a 30 day or 90 day RX:60  Preferred Pharmacy with phone number:  [facility] #[id number] [place] [id number] [address]  Phone: [phone] Fax: [phone]  Local or Mail Order:Local   Ordering Provider: NICOLÁS Shelton   Would the patient rather a call back or a response via [facility]? JENNYFER   Best Call Back Number:[phone]    Additional Information:

## 2022-08-05 DIAGNOSIS — F90.2 ATTENTION DEFICIT HYPERACTIVITY DISORDER (ADHD), COMBINED TYPE: ICD-10-CM

## 2022-08-05 RX ORDER — DEXTROAMPHETAMINE SACCHARATE, AMPHETAMINE ASPARTATE, DEXTROAMPHETAMINE SULFATE AND AMPHETAMINE SULFATE 5; 5; 5; 5 MG/1; MG/1; MG/1; MG/1
1 TABLET ORAL 2 TIMES DAILY
Qty: 60 TABLET | Refills: 0 | Status: SHIPPED | OUTPATIENT
Start: 2022-08-05 | End: 2022-09-13

## 2022-08-05 NOTE — TELEPHONE ENCOUNTER
----- Message from Maria Del Carmen Villar sent at 8/5/2022 12:08 PM CDT -----  Contact: self  Type:  RX Refill Request    Who Called:  Bhavik Malone   Refill or New Rx: REFILL  RX Name and Strength: dextroamphetamine-amphetamine (ADDERALL) 20 mg tablet  How is the patient currently taking it? (ex. 1XDay): 2 DAILY  Is this a 30 day or 90 day RX: 30 DAY    Preferred Pharmacy with phone number:   [facility] #[id number] [address] [address]  Phone: [phone] Fax: [phone]     Local or Mail Order: LOCAL  Ordering Provider: GAYE  Would the patient rather a call back or a response via [facility]?  CALL BACK   Best Call Back Number: [phone]   Additional Information: N/A

## 2022-08-08 DIAGNOSIS — F90.2 ATTENTION DEFICIT HYPERACTIVITY DISORDER (ADHD), COMBINED TYPE: ICD-10-CM

## 2022-08-08 RX ORDER — DEXTROAMPHETAMINE SACCHARATE, AMPHETAMINE ASPARTATE, DEXTROAMPHETAMINE SULFATE AND AMPHETAMINE SULFATE 5; 5; 5; 5 MG/1; MG/1; MG/1; MG/1
1 TABLET ORAL 2 TIMES DAILY
Qty: 60 TABLET | Refills: 0 | Status: SHIPPED | OUTPATIENT
Start: 2022-08-08 | End: 2022-09-07 | Stop reason: SDUPTHER

## 2022-08-08 NOTE — TELEPHONE ENCOUNTER
----- Message from Jose Fields sent at 8/8/2022 11:59 AM CDT -----  Contact: Patient  Patient need his RX sent to the below pharmacy      dextroamphetamine-amphetamine (ADDERALL) 20 mg tablet                [facility] #[id number] - [place] [address] [address]  Phone: [phone] Fax: [phone]      Call back # for patient    [phone]

## 2022-09-07 DIAGNOSIS — F90.2 ATTENTION DEFICIT HYPERACTIVITY DISORDER (ADHD), COMBINED TYPE: ICD-10-CM

## 2022-09-07 RX ORDER — DEXTROAMPHETAMINE SACCHARATE, AMPHETAMINE ASPARTATE, DEXTROAMPHETAMINE SULFATE AND AMPHETAMINE SULFATE 5; 5; 5; 5 MG/1; MG/1; MG/1; MG/1
1 TABLET ORAL 2 TIMES DAILY
Qty: 60 TABLET | Refills: 0 | Status: SHIPPED | OUTPATIENT
Start: 2022-09-07 | End: 2022-09-13

## 2022-09-07 NOTE — TELEPHONE ENCOUNTER
----- Message from Kristin Pardo sent at 9/7/2022  9:02 AM CDT -----  Contact: pt      Who Called:Bhavik   Who Left Message for Patient:  Does the patient know what this is regarding?:pts appt was r/c and was told they would refill his rx before appt since he is out   Would the patient rather a call back or a response via [facility]?   Best Call Back Number:.[phone]    Additional Information: .  dextroamphetamine-amphetamine (ADDERALL) 20 mg tablet    [facility] #[id number] [address] [address]  Phone: [phone] Fax: [phone]

## 2022-09-07 NOTE — TELEPHONE ENCOUNTER
----- Message from Kristin Pardo sent at 9/7/2022 12:32 PM CDT -----  Contact: pt      Who Called:neil     Does the patient know what this is regarding?:dextroamphetamine-amphetamine (ADDERALL) 20 mg tablet  Would the patient rather a call back or a response via [facility]?   Best Call Back Number:.[phone]    Additional Information: pt is out of rx and is needing refill before apt

## 2022-09-13 ENCOUNTER — OFFICE VISIT (OUTPATIENT)
Dept: PRIMARY CARE CLINIC | Facility: CLINIC | Age: 27
End: 2022-09-13
Payer: COMMERCIAL

## 2022-09-13 VITALS
DIASTOLIC BLOOD PRESSURE: 84 MMHG | OXYGEN SATURATION: 100 % | WEIGHT: 148.19 LBS | HEIGHT: 68 IN | HEART RATE: 83 BPM | RESPIRATION RATE: 18 BRPM | BODY MASS INDEX: 22.46 KG/M2 | SYSTOLIC BLOOD PRESSURE: 136 MMHG

## 2022-09-13 DIAGNOSIS — F90.2 ATTENTION DEFICIT HYPERACTIVITY DISORDER (ADHD), COMBINED TYPE: ICD-10-CM

## 2022-09-13 PROCEDURE — 99214 PR OFFICE/OUTPT VISIT, EST, LEVL IV, 30-39 MIN: ICD-10-PCS | Mod: S$GLB,,, | Performed by: NURSE PRACTITIONER

## 2022-09-13 PROCEDURE — 99214 OFFICE O/P EST MOD 30 MIN: CPT | Mod: S$GLB,,, | Performed by: NURSE PRACTITIONER

## 2022-09-13 PROCEDURE — 3075F SYST BP GE 130 - 139MM HG: CPT | Mod: CPTII,S$GLB,, | Performed by: NURSE PRACTITIONER

## 2022-09-13 PROCEDURE — 1160F RVW MEDS BY RX/DR IN RCRD: CPT | Mod: CPTII,S$GLB,, | Performed by: NURSE PRACTITIONER

## 2022-09-13 PROCEDURE — 3079F DIAST BP 80-89 MM HG: CPT | Mod: CPTII,S$GLB,, | Performed by: NURSE PRACTITIONER

## 2022-09-13 PROCEDURE — 3075F PR MOST RECENT SYSTOLIC BLOOD PRESS GE 130-139MM HG: ICD-10-PCS | Mod: CPTII,S$GLB,, | Performed by: NURSE PRACTITIONER

## 2022-09-13 PROCEDURE — 3008F BODY MASS INDEX DOCD: CPT | Mod: CPTII,S$GLB,, | Performed by: NURSE PRACTITIONER

## 2022-09-13 PROCEDURE — 3079F PR MOST RECENT DIASTOLIC BLOOD PRESSURE 80-89 MM HG: ICD-10-PCS | Mod: CPTII,S$GLB,, | Performed by: NURSE PRACTITIONER

## 2022-09-13 PROCEDURE — 1159F PR MEDICATION LIST DOCUMENTED IN MEDICAL RECORD: ICD-10-PCS | Mod: CPTII,S$GLB,, | Performed by: NURSE PRACTITIONER

## 2022-09-13 PROCEDURE — 3008F PR BODY MASS INDEX (BMI) DOCUMENTED: ICD-10-PCS | Mod: CPTII,S$GLB,, | Performed by: NURSE PRACTITIONER

## 2022-09-13 PROCEDURE — 1159F MED LIST DOCD IN RCRD: CPT | Mod: CPTII,S$GLB,, | Performed by: NURSE PRACTITIONER

## 2022-09-13 PROCEDURE — 1160F PR REVIEW ALL MEDS BY PRESCRIBER/CLIN PHARMACIST DOCUMENTED: ICD-10-PCS | Mod: CPTII,S$GLB,, | Performed by: NURSE PRACTITIONER

## 2022-09-13 RX ORDER — DEXTROAMPHETAMINE SACCHARATE, AMPHETAMINE ASPARTATE, DEXTROAMPHETAMINE SULFATE AND AMPHETAMINE SULFATE 7.5; 7.5; 7.5; 7.5 MG/1; MG/1; MG/1; MG/1
1 TABLET ORAL 2 TIMES DAILY
Qty: 60 TABLET | Refills: 0 | Status: SHIPPED | OUTPATIENT
Start: 2022-11-05 | End: 2023-02-06 | Stop reason: SDUPTHER

## 2022-09-13 RX ORDER — DEXTROAMPHETAMINE SACCHARATE, AMPHETAMINE ASPARTATE, DEXTROAMPHETAMINE SULFATE AND AMPHETAMINE SULFATE 7.5; 7.5; 7.5; 7.5 MG/1; MG/1; MG/1; MG/1
1 TABLET ORAL 2 TIMES DAILY
Qty: 60 TABLET | Refills: 0 | Status: SHIPPED | OUTPATIENT
Start: 2022-10-05 | End: 2022-12-05 | Stop reason: SDUPTHER

## 2022-09-13 RX ORDER — DEXTROAMPHETAMINE SACCHARATE, AMPHETAMINE ASPARTATE, DEXTROAMPHETAMINE SULFATE AND AMPHETAMINE SULFATE 7.5; 7.5; 7.5; 7.5 MG/1; MG/1; MG/1; MG/1
1 TABLET ORAL 2 TIMES DAILY
Qty: 60 TABLET | Refills: 0 | Status: SHIPPED | OUTPATIENT
Start: 2022-12-05 | End: 2023-01-05 | Stop reason: SDUPTHER

## 2022-09-13 NOTE — PROGRESS NOTES
Subjective:       Patient ID: Bhavik Malone is a 27 y.o. male.    Chief Complaint: Follow-up    HPI: Bhavik is a 27 y.o. male who presents for F/U.    Feels well today with no new issues or concerns.    ADHD - Started on Adderall 20 mg BID at his last visit and it is working well for him but is not lasting throughout the day so he would like to increase the dose.    He does have a history of hemorrhoids which he uses preparation H and has started eating a high fiber diet.    Smokes 1/2 ppd of cigarettes daily.    Refuses all vaccines.                History reviewed. No pertinent past medical history.    History reviewed. No pertinent surgical history.    History reviewed. No pertinent family history.    Social History     Tobacco Use    Smoking status: Light Smoker    Smokeless tobacco: Current   Substance Use Topics    Alcohol use: Yes    Drug use: Never       Patient Active Problem List   Diagnosis    Attention deficit hyperactivity disorder (ADHD), combined type    Other hemorrhoids         There is no immunization history on file for this patient.        Review of Systems   Constitutional:  Positive for fatigue. Negative for activity change, appetite change, chills, diaphoresis and fever.   HENT:  Negative for congestion, ear pain, tinnitus and trouble swallowing.    Eyes:  Negative for visual disturbance.   Respiratory:  Negative for cough, chest tightness, shortness of breath and wheezing.    Cardiovascular:  Negative for chest pain, palpitations and leg swelling.   Gastrointestinal:  Negative for abdominal distention, abdominal pain, blood in stool, constipation, diarrhea, nausea and vomiting.   Endocrine: Negative for polyuria.   Genitourinary:  Negative for difficulty urinating, dysuria, frequency, hematuria and urgency.   Musculoskeletal:  Negative for gait problem, neck pain and neck stiffness.   Skin:  Negative for color change and rash.   Neurological:  Negative for dizziness, speech difficulty,  "weakness, light-headedness, numbness and headaches.   Psychiatric/Behavioral:  Positive for decreased concentration. Negative for behavioral problems, confusion, dysphoric mood and suicidal ideas. The patient is nervous/anxious.    Objective:     Vitals:    09/13/22 1426   BP: 136/84   BP Location: Left arm   Patient Position: Sitting   BP Method: Large (Automatic)   Pulse: 83   Resp: 18   SpO2: 100%   Weight: 67.2 kg (148 lb 3.2 oz)   Height: 5' 8" (1.727 m)       Physical Exam  Vitals and nursing note reviewed.   Constitutional:       Appearance: Normal appearance.   HENT:      Head: Normocephalic and atraumatic.      Mouth/Throat:      Mouth: Mucous membranes are moist.      Pharynx: Oropharynx is clear.   Eyes:      Conjunctiva/sclera: Conjunctivae normal.      Pupils: Pupils are equal, round, and reactive to light.   Cardiovascular:      Rate and Rhythm: Normal rate and regular rhythm.   Pulmonary:      Effort: Pulmonary effort is normal.      Breath sounds: Normal breath sounds.   Abdominal:      General: There is no distension.      Tenderness: There is no abdominal tenderness.   Musculoskeletal:         General: Normal range of motion.      Cervical back: Normal range of motion and neck supple.      Right lower leg: No edema.      Left lower leg: No edema.   Skin:     General: Skin is warm and dry.      Capillary Refill: Capillary refill takes less than 2 seconds.   Neurological:      Mental Status: He is alert and oriented to person, place, and time.   Psychiatric:         Mood and Affect: Affect normal. Mood is anxious.         Behavior: Behavior normal. Behavior is cooperative.         Thought Content: Thought content normal. Thought content does not include homicidal or suicidal ideation. Thought content does not include homicidal or suicidal plan.         Judgment: Judgment normal.       No visits with results within 6 Month(s) from this visit.   Latest known visit with results is:   No results found " for any previous visit.         Assessment:      1. Attention deficit hyperactivity disorder (ADHD), combined type Well controlled         Plan:     Attention deficit hyperactivity disorder (ADHD), combined type  Comments:  Adderall refilled, F/U in 3 months  Orders:  -     dextroamphetamine-amphetamine (ADDERALL) 30 mg Tab; Take 1 tablet (30 mg total) by mouth 2 (two) times daily.  Dispense: 60 tablet; Refill: 0  -     dextroamphetamine-amphetamine (ADDERALL) 30 mg Tab; Take 1 tablet (30 mg total) by mouth 2 (two) times daily.  Dispense: 60 tablet; Refill: 0  -     dextroamphetamine-amphetamine (ADDERALL) 30 mg Tab; Take 1 tablet (30 mg total) by mouth 2 (two) times daily.  Dispense: 60 tablet; Refill: 0       Current Outpatient Medications   Medication Sig Dispense Refill    [START ON 10/5/2022] dextroamphetamine-amphetamine (ADDERALL) 30 mg Tab Take 1 tablet (30 mg total) by mouth 2 (two) times daily. 60 tablet 0    [START ON 12/5/2022] dextroamphetamine-amphetamine (ADDERALL) 30 mg Tab Take 1 tablet (30 mg total) by mouth 2 (two) times daily. 60 tablet 0    [START ON 11/5/2022] dextroamphetamine-amphetamine (ADDERALL) 30 mg Tab Take 1 tablet (30 mg total) by mouth 2 (two) times daily. 60 tablet 0     No current facility-administered medications for this visit.       Medications Discontinued During This Encounter   Medication Reason    dextroamphetamine-amphetamine (ADDERALL) 20 mg tablet Change in Dosage Form    dextroamphetamine-amphetamine (ADDERALL) 20 mg tablet Change in Dosage Form    dextroamphetamine-amphetamine (ADDERALL) 20 mg tablet Change in Dosage Form       Health Maintenance   Topic Date Due    Hepatitis C Screening  Never done    Lipid Panel  Never done    TETANUS VACCINE  Never done       Patient Instructions   RTC in 3 months for F/U or sooner if needed.    Keep appts with specialists as scheduled.    Instructed patient to report to nearest ER or call 911 if begins to have difficulty breathing,  turning blue, chest pain, B/P < 80/60 or >170/100, palpitations, syncope, extreme weakness, or severe H/A. Patient verbalized understanding.      Patient Education       Attention Deficit Hyperactivity Disorder (ADHD) Discharge Instructions   About this topic   Attention deficit hyperactivity disorder is also called ADHD or ADD. Most often, this starts at a young age. This disorder makes it hard to focus or sit still. People with ADHD may find it hard to make good decisions. Children with ADHD may have trouble in school and at home. Adults may have problems at work. People with ADHD may also have a problem getting along well with others. While there is no cure for ADHD, you and your doctor can work on a plan that is best for you to treat the signs of ADHD.  What care is needed at home?   Ask your doctor what you need to do when you go home. Make sure you ask questions if you do not understand what the doctor says. This way you will know what you need to do.  Try to get enough sleep at night. Most often adults need 7 to 8 hours each night and children need 8 to 10 hours each night. Rest during the day if you are tired.  Eat and sleep at the same times every day.  Have a plan for where to keep things in your home. Use checklists, things to help you remember, and alarms. A list of things you may need to find in a hurry can be helpful. These can help you put things in the right place and manage your time.  Work on getting better at reading and taking notes.  Have a space that is just for work or homework so you will be able to pay attention. This may be an office or a desk facing a wall. Try using headphones so you cannot hear the other noises.  Do one thing at a time. Keep a list of the next things you were planning to do or say.  Break large jobs or things you have to do into smaller jobs. This will make them easier to finish. Reward yourself along the way.  Have rules that are clear and easy to follow.   Look at  where you are the strongest. Give rewards for good behavior, finished schoolwork, or for doing a good job at work.  Do not do too many things that might cause stress.  What follow-up care is needed?   Your doctor may ask you to make visits to the office to check on your progress. Be sure to keep these visits.  Your doctor may send you to a special mental health doctor. This person will talk with you about the problems you are having. Then, you can work together to find ways to help you manage them.  Your doctor may suggest you try talk therapy to help you live well with your ADHD.  What drugs may be needed?   The doctor may order drugs to:  Help lower your worry  Help you to pay attention  Help you be more calm  Help you be less impulsive  Help keep things in your life balanced  Care for low mood  Will physical activity be limited?   Physical activity will help keep your mind and body in good shape. Talk with your doctor about the right amount of activity for you.  What problems could happen?   Feeling badly about yourself  Raise the chances of you hurting yourself, such as injury in a car accident  Not do well in school and work  Trouble making friends or getting along well with others  Raise your chance to abuse alcohol or drugs  What can be done to prevent this health problem?   The cause of ADHD is not clear, but to lower the chance of your child having ADHD:  Do not drink beer, wine, or mixed drinks (alcohol) while you are pregnant.  Do not smoke or use illegal drugs while you are pregnant.  Keep your child away from things like harmful toxins and chemicals.  Keep your child from having too much time in front of computers, TV, and video games.  Get plenty of exercise.  Be more aware of thoughts, emotions, and experiences each moment. This is mindfulness.  When do I need to call the doctor?   Drugs are not working  Symptoms are getting worse  Teach Back: Helping You Understand   The Teach Back Method helps you  understand the information we are giving you. After you talk with the staff, tell them in your own words what you learned. This helps to make sure the staff has described each thing clearly. It also helps to explain things that may have been confusing. Before going home, make sure you can do these:  I can tell you about my condition.  I can tell you ways to help me pay attention.  I can tell you what I will do if I think my drugs are not working.  Where can I learn more?   HelpGuide.org  http://www.helpguide.org/articles/add-adhd/attention-deficit-disorder-adhd-parenting-tips.htm   KidsHealth  http://kidshealth.org/parent/medical/learning/adhd.html   [employer] [facility]  http://www.[facility].nih.gov/health/topics/attention-deficit-hyperactivity-disorder-adhd/index.shtml   Last Reviewed Date   2020-06-14  Consumer Information Use and Disclaimer   This information is not specific medical advice and does not replace information you receive from your health care provider. This is only a brief summary of general information. It does NOT include all information about conditions, illnesses, injuries, tests, procedures, treatments, therapies, discharge instructions or life-style choices that may apply to you. You must talk with your health care provider for complete information about your health and treatment options. This information should not be used to decide whether or not to accept your health care providers advice, instructions or recommendations. Only your health care provider has the knowledge and training to provide advice that is right for you.  Copyright   Copyright © 2021 [employer], [employer]. and its affiliates and/or licensors. All rights reserved.    Risks, benefits, and alternatives discussed with patient, Patient verbalized understanding of discussed plan of care. Asked patient if any further questions, answered no.    Future Appointments   Date Time Provider Department Center   12/14/2022  2:00 PM  Latha Shelton, NP [facility] Ata Shelton, NP

## 2022-12-05 DIAGNOSIS — F90.2 ATTENTION DEFICIT HYPERACTIVITY DISORDER (ADHD), COMBINED TYPE: ICD-10-CM

## 2022-12-05 RX ORDER — DEXTROAMPHETAMINE SACCHARATE, AMPHETAMINE ASPARTATE, DEXTROAMPHETAMINE SULFATE AND AMPHETAMINE SULFATE 7.5; 7.5; 7.5; 7.5 MG/1; MG/1; MG/1; MG/1
1 TABLET ORAL 2 TIMES DAILY
Qty: 60 TABLET | Refills: 0 | Status: SHIPPED | OUTPATIENT
Start: 2022-12-05 | End: 2023-05-17 | Stop reason: SDUPTHER

## 2022-12-05 NOTE — TELEPHONE ENCOUNTER
----- Message from Pan Fernandez sent at 12/5/2022 12:45 PM CST -----  Contact: Pt  Type:  RX Refill Request    Who Called:  pt   Refill or New Rx: refill   RX Name and Strength: adderrall 20 mg   How is the patient currently taking it? (ex. 1XDay): twice daily   Is this a 30 day or 90 day RX: 30 days   Preferred Pharmacy with phone number: [facility]   Local or Mail Order: local  Ordering Provider: kosta   Would the patient rather a call back or a response via [facility]? phone  Best Call Back Number: [phone]  Additional Information: pt is requesting to have it called in today and pls call when it's been called in     Pt's original pharm is out of stock of the med      [facility]   [phone]

## 2023-01-05 DIAGNOSIS — F90.2 ATTENTION DEFICIT HYPERACTIVITY DISORDER (ADHD), COMBINED TYPE: ICD-10-CM

## 2023-01-05 RX ORDER — DEXTROAMPHETAMINE SACCHARATE, AMPHETAMINE ASPARTATE, DEXTROAMPHETAMINE SULFATE AND AMPHETAMINE SULFATE 7.5; 7.5; 7.5; 7.5 MG/1; MG/1; MG/1; MG/1
1 TABLET ORAL 2 TIMES DAILY
Qty: 60 TABLET | Refills: 0 | Status: SHIPPED | OUTPATIENT
Start: 2023-01-05 | End: 2023-06-19 | Stop reason: RX

## 2023-01-05 NOTE — TELEPHONE ENCOUNTER
----- Message from Maria Del Carmen Villar sent at 1/5/2023  9:14 AM CST -----  Contact: self  Type:  RX Refill Request    Who Called: Bhavik Malone   Refill or New Rx: REFILL   RX Name and Strength: dextroamphetamine-amphetamine (ADDERALL) 30 mg Tab  How is the patient currently taking it? (ex. 1XDay): 1 TAB 2X DAILY  Is this a 30 day or 90 day RX: 30 DAY    Preferred Pharmacy with phone number:   [facility] - [place], [place] - [address]  [address] [facility] [address]  Phone: [phone] Fax: [phone]     Local or Mail Order: LOCAL  Ordering Provider: GAYE  Would the patient rather a call back or a response via [facility]? CALL  Best Call Back Number: [phone]   Additional Information: N/A

## 2023-01-06 RX ORDER — DEXTROAMPHETAMINE SACCHARATE, AMPHETAMINE ASPARTATE, DEXTROAMPHETAMINE SULFATE AND AMPHETAMINE SULFATE 7.5; 7.5; 7.5; 7.5 MG/1; MG/1; MG/1; MG/1
1 TABLET ORAL 2 TIMES DAILY
Qty: 60 TABLET | Refills: 0 | Status: CANCELLED | OUTPATIENT
Start: 2023-01-06

## 2023-01-06 NOTE — TELEPHONE ENCOUNTER
----- Message from Radha Palmer sent at 1/6/2023  9:31 AM CST -----  Contact: SELF  Type - Refill Request to updated Pharmacy     Patient's previous pharmacy was out of his medication and he was able to get one that has it, can we send the dextroamphetamine-amphetamine (ADDERALL) 30 mg Tab to     [facility] - [place], [place] - [address], [address]  [address], [address]  [facility] [address]  Phone: [phone] Fax: [phone]    For 60 day supply and please let patient know once it has been sent - thanks!

## 2023-02-06 DIAGNOSIS — F90.2 ATTENTION DEFICIT HYPERACTIVITY DISORDER (ADHD), COMBINED TYPE: ICD-10-CM

## 2023-02-06 RX ORDER — DEXTROAMPHETAMINE SACCHARATE, AMPHETAMINE ASPARTATE, DEXTROAMPHETAMINE SULFATE AND AMPHETAMINE SULFATE 7.5; 7.5; 7.5; 7.5 MG/1; MG/1; MG/1; MG/1
1 TABLET ORAL 2 TIMES DAILY
Qty: 60 TABLET | Refills: 0 | Status: SHIPPED | OUTPATIENT
Start: 2023-02-06 | End: 2023-06-19 | Stop reason: RX

## 2023-02-06 NOTE — TELEPHONE ENCOUNTER
----- Message from Anabel Brown sent at 2/6/2023  9:01 AM CST -----  Contact: self  Type:  RX Refill Request    Who Called: Bhavik Malone    Refill or New Rx:refill  RX Name and Strength:dextroamphetamine-amphetamine (ADDERALL) 30 mg Tab  How is the patient currently taking it? (ex. 1XDay):2 x daily  Is this a 30 day or 90 day RX:30  Preferred Pharmacy with phone number:    [facility] - [place], [place] - [address]  [address] [facility] [address]  Phone: [phone] Fax: [phone]        Local or Mail Order:local  Ordering Provider:sandra brambila  Would the patient rather a call back or a response via [facility]? N/a  Best Call Back Number:n/a  Additional Information: n/a

## 2023-03-07 DIAGNOSIS — F90.2 ATTENTION DEFICIT HYPERACTIVITY DISORDER (ADHD), COMBINED TYPE: ICD-10-CM

## 2023-03-07 RX ORDER — DEXTROAMPHETAMINE SACCHARATE, AMPHETAMINE ASPARTATE, DEXTROAMPHETAMINE SULFATE AND AMPHETAMINE SULFATE 7.5; 7.5; 7.5; 7.5 MG/1; MG/1; MG/1; MG/1
1 TABLET ORAL 2 TIMES DAILY
Qty: 60 TABLET | Refills: 0 | OUTPATIENT
Start: 2023-03-07

## 2023-03-07 NOTE — TELEPHONE ENCOUNTER
----- Message from Malika Donaldson sent at 3/7/2023 12:16 PM CST -----  Contact: Patient  Type:  RX Refill Request    Who Called: Bhavik Malone  Refill or New Rx:refill   RX Name and Strength:dextroamphetamine-amphetamine (ADDERALL) 30 mg Tab  How is the patient currently taking it? (ex. 1XDay):2xday   Is this a 30 day or 90 day RX:  Preferred Pharmacy with phone number:[phone]  [facility] - [place] [address]  [address] [facility] [address]  Phone: [phone] Fax: [phone]  Local or Mail Order:local   Ordering Provider:Latha Shelton  Would the patient rather a call back or a response via [facility]? Call back   Best Call Back Number:[phone]  Additional Information:

## 2023-03-09 ENCOUNTER — OFFICE VISIT (OUTPATIENT)
Dept: PRIMARY CARE CLINIC | Facility: CLINIC | Age: 28
End: 2023-03-09
Payer: COMMERCIAL

## 2023-03-09 VITALS
BODY MASS INDEX: 21.7 KG/M2 | HEIGHT: 68 IN | SYSTOLIC BLOOD PRESSURE: 123 MMHG | HEART RATE: 112 BPM | WEIGHT: 143.19 LBS | OXYGEN SATURATION: 97 % | DIASTOLIC BLOOD PRESSURE: 70 MMHG

## 2023-03-09 DIAGNOSIS — Z13.220 SCREENING FOR CHOLESTEROL LEVEL: ICD-10-CM

## 2023-03-09 DIAGNOSIS — Z00.00 ANNUAL PHYSICAL EXAM: ICD-10-CM

## 2023-03-09 DIAGNOSIS — E55.9 VITAMIN D DEFICIENCY: ICD-10-CM

## 2023-03-09 DIAGNOSIS — F90.2 ATTENTION DEFICIT HYPERACTIVITY DISORDER (ADHD), COMBINED TYPE: Primary | ICD-10-CM

## 2023-03-09 DIAGNOSIS — Z11.59 NEED FOR HEPATITIS C SCREENING TEST: ICD-10-CM

## 2023-03-09 DIAGNOSIS — Z11.4 SCREENING FOR HIV (HUMAN IMMUNODEFICIENCY VIRUS): ICD-10-CM

## 2023-03-09 DIAGNOSIS — Z13.29 THYROID DISORDER SCREEN: ICD-10-CM

## 2023-03-09 DIAGNOSIS — Z13.1 DIABETES MELLITUS SCREENING: ICD-10-CM

## 2023-03-09 PROCEDURE — 3078F DIAST BP <80 MM HG: CPT | Mod: CPTII,S$GLB,, | Performed by: NURSE PRACTITIONER

## 2023-03-09 PROCEDURE — 3074F PR MOST RECENT SYSTOLIC BLOOD PRESSURE < 130 MM HG: ICD-10-PCS | Mod: CPTII,S$GLB,, | Performed by: NURSE PRACTITIONER

## 2023-03-09 PROCEDURE — 3074F SYST BP LT 130 MM HG: CPT | Mod: CPTII,S$GLB,, | Performed by: NURSE PRACTITIONER

## 2023-03-09 PROCEDURE — 3008F BODY MASS INDEX DOCD: CPT | Mod: CPTII,S$GLB,, | Performed by: NURSE PRACTITIONER

## 2023-03-09 PROCEDURE — 3008F PR BODY MASS INDEX (BMI) DOCUMENTED: ICD-10-PCS | Mod: CPTII,S$GLB,, | Performed by: NURSE PRACTITIONER

## 2023-03-09 PROCEDURE — 3078F PR MOST RECENT DIASTOLIC BLOOD PRESSURE < 80 MM HG: ICD-10-PCS | Mod: CPTII,S$GLB,, | Performed by: NURSE PRACTITIONER

## 2023-03-09 PROCEDURE — 99395 PR PREVENTIVE VISIT,EST,18-39: ICD-10-PCS | Mod: S$GLB,,, | Performed by: NURSE PRACTITIONER

## 2023-03-09 PROCEDURE — 99395 PREV VISIT EST AGE 18-39: CPT | Mod: S$GLB,,, | Performed by: NURSE PRACTITIONER

## 2023-03-09 RX ORDER — DEXTROAMPHETAMINE SACCHARATE, AMPHETAMINE ASPARTATE, DEXTROAMPHETAMINE SULFATE AND AMPHETAMINE SULFATE 5; 5; 5; 5 MG/1; MG/1; MG/1; MG/1
1 TABLET ORAL DAILY
Qty: 60 TABLET | Refills: 0 | Status: SHIPPED | OUTPATIENT
Start: 2023-03-09 | End: 2023-03-13 | Stop reason: SDUPTHER

## 2023-03-09 NOTE — PROGRESS NOTES
Subjective:       Patient ID: Bhavik Malone is a 28 y.o. male.    Chief Complaint: Annual Exam      HPI: Bhavik is a 28 y.o. male who presents for annual visit.    Feels well today with no new issues or concerns.    ADHD - controlled with Adderall BID, medication is working well for him. No s/e of CP, palpitations, syncope, actually feels much better, able to stay focused and concentrate throughout the day. Able to complete tasks within timely manner.       He does have a history of hemorrhoids which he uses preparation H when needed and has started eating a high fiber diet.    Smokes 1/2 ppd of cigarettes daily. Refuses smoking cessation.    Refuses all vaccines.                History reviewed. No pertinent past medical history.    History reviewed. No pertinent surgical history.    History reviewed. No pertinent family history.    Social History     Tobacco Use    Smoking status: Light Smoker    Smokeless tobacco: Current   Substance Use Topics    Alcohol use: Yes    Drug use: Never       Patient Active Problem List   Diagnosis    Attention deficit hyperactivity disorder (ADHD), combined type    Other hemorrhoids         There is no immunization history on file for this patient.        Review of Systems   Constitutional:  Negative for activity change, appetite change, chills, diaphoresis, fatigue and fever.   HENT:  Negative for congestion, ear pain, tinnitus and trouble swallowing.    Eyes:  Negative for visual disturbance.   Respiratory:  Negative for cough, chest tightness, shortness of breath and wheezing.    Cardiovascular:  Negative for chest pain, palpitations and leg swelling.   Gastrointestinal:  Negative for abdominal distention, abdominal pain, blood in stool, constipation, diarrhea, nausea and vomiting.   Endocrine: Negative for cold intolerance, heat intolerance, polydipsia, polyphagia and polyuria.   Genitourinary:  Negative for difficulty urinating, dysuria, frequency, hematuria and urgency.  "  Musculoskeletal:  Negative for gait problem, neck pain and neck stiffness.   Skin:  Negative for color change and rash.   Neurological:  Negative for dizziness, speech difficulty, weakness, light-headedness, numbness and headaches.   Psychiatric/Behavioral:  Negative for behavioral problems, confusion, decreased concentration, dysphoric mood and suicidal ideas. The patient is not nervous/anxious.    Objective:     Vitals:    03/09/23 1617   BP: 123/70   BP Location: Right arm   Patient Position: Sitting   BP Method: Medium (Automatic)   Pulse: (!) 112   SpO2: 97%   Weight: 65 kg (143 lb 3.2 oz)   Height: 5' 8" (1.727 m)         Physical Exam  Vitals and nursing note reviewed.   Constitutional:       Appearance: Normal appearance.   HENT:      Head: Normocephalic and atraumatic.      Mouth/Throat:      Mouth: Mucous membranes are moist.      Pharynx: Oropharynx is clear.   Eyes:      Conjunctiva/sclera: Conjunctivae normal.      Pupils: Pupils are equal, round, and reactive to light.   Neck:      Thyroid: No thyromegaly or thyroid tenderness.   Cardiovascular:      Rate and Rhythm: Normal rate and regular rhythm.   Pulmonary:      Effort: Pulmonary effort is normal.      Breath sounds: Normal breath sounds.   Abdominal:      General: Bowel sounds are normal. There is no distension.      Palpations: Abdomen is soft.      Tenderness: There is no abdominal tenderness.   Musculoskeletal:         General: Normal range of motion.      Cervical back: Normal range of motion and neck supple.      Right lower leg: No edema.      Left lower leg: No edema.   Skin:     General: Skin is warm and dry.      Capillary Refill: Capillary refill takes less than 2 seconds.   Neurological:      Mental Status: He is alert and oriented to person, place, and time.   Psychiatric:         Mood and Affect: Mood and affect normal.         Behavior: Behavior normal. Behavior is cooperative.         Thought Content: Thought content normal. " Thought content does not include homicidal or suicidal ideation. Thought content does not include homicidal or suicidal plan.         Judgment: Judgment normal.       No visits with results within 6 Month(s) from this visit.   Latest known visit with results is:   No results found for any previous visit.         Assessment:      1. Attention deficit hyperactivity disorder (ADHD), combined type Well controlled   2. Annual physical exam    3. Screening for cholesterol level    4. Thyroid disorder screen    5. Vitamin D deficiency    6. Diabetes mellitus screening    7. Need for hepatitis C screening test    8. Screening for HIV (human immunodeficiency virus)            Plan:     Attention deficit hyperactivity disorder (ADHD), combined type  Comments:  continue Adderall BID, F/U in 3 months  Orders:  -     Discontinue: dextroamphetamine-amphetamine (ADDERALL) 20 mg tablet; Take 1 tablet by mouth once daily.  Dispense: 60 tablet; Refill: 0    Annual physical exam  Comments:  Will review labs and determine POC based on results  Orders:  -     CBC Auto Differential; Future; Expected date: 03/22/2023  -     Comprehensive Metabolic Panel; Future; Expected date: 03/22/2023  -     Hemoglobin A1C; Future; Expected date: 03/22/2023  -     Lipid Panel; Future; Expected date: 03/22/2023  -     TSH w/reflex to FT4; Future; Expected date: 03/22/2023    Screening for cholesterol level  -     Lipid Panel; Future; Expected date: 03/22/2023    Thyroid disorder screen  -     TSH w/reflex to FT4; Future; Expected date: 03/22/2023    Vitamin D deficiency  -     Vitamin D; Future; Expected date: 03/22/2023    Diabetes mellitus screening  -     Hemoglobin A1C; Future; Expected date: 03/22/2023    Need for hepatitis C screening test  -     Hepatitis C Antibody; Future; Expected date: 03/22/2023    Screening for HIV (human immunodeficiency virus)  -     HIV 1/2 Ag/Ab (4th Gen); Future; Expected date: 03/22/2023           Current Outpatient  Medications   Medication Sig Dispense Refill    dextroamphetamine-amphetamine (ADDERALL) 20 mg tablet Take 1 tablet by mouth 2 (two) times a day. 60 tablet 0    dextroamphetamine-amphetamine (ADDERALL) 30 mg Tab Take 1 tablet (30 mg total) by mouth 2 (two) times daily. 60 tablet 0    dextroamphetamine-amphetamine (ADDERALL) 30 mg Tab Take 1 tablet (30 mg total) by mouth 2 (two) times daily. 60 tablet 0    dextroamphetamine-amphetamine (ADDERALL) 30 mg Tab Take 1 tablet (30 mg total) by mouth 2 (two) times daily. 60 tablet 0     No current facility-administered medications for this visit.       There are no discontinued medications.      Health Maintenance   Topic Date Due    Hepatitis C Screening  Never done    Lipid Panel  Never done    TETANUS VACCINE  Never done       Patient Instructions   RTC in 3 months for F/U or sooner if needed.    Patient Education       Attention Deficit Hyperactivity Disorder (ADHD) Discharge Instructions   About this topic   Attention deficit hyperactivity disorder is also called ADHD or ADD. Most often, this starts at a young age. This disorder makes it hard to focus or sit still. People with ADHD may find it hard to make good decisions. Children with ADHD may have trouble in school and at home. Adults may have problems at work. People with ADHD may also have a problem getting along well with others. While there is no cure for ADHD, you and your doctor can work on a plan that is best for you to treat the signs of ADHD.  What care is needed at home?   Ask your doctor what you need to do when you go home. Make sure you ask questions if you do not understand what the doctor says. This way you will know what you need to do.  Try to get enough sleep at night. Most often adults need 7 to 8 hours each night and children need 8 to 10 hours each night. Rest during the day if you are tired.  Eat and sleep at the same times every day.  Have a plan for where to keep things in your home. Use  checklists, things to help you remember, and alarms. A list of things you may need to find in a hurry can be helpful. These can help you put things in the right place and manage your time.  Work on getting better at reading and taking notes.  Have a space that is just for work or homework so you will be able to pay attention. This may be an office or a desk facing a wall. Try using headphones so you cannot hear the other noises.  Do one thing at a time. Keep a list of the next things you were planning to do or say.  Break large jobs or things you have to do into smaller jobs. This will make them easier to finish. Reward yourself along the way.  Have rules that are clear and easy to follow.   Look at where you are the strongest. Give rewards for good behavior, finished schoolwork, or for doing a good job at work.  Do not do too many things that might cause stress.  What follow-up care is needed?   Your doctor may ask you to make visits to the office to check on your progress. Be sure to keep these visits.  Your doctor may send you to a special mental health doctor. This person will talk with you about the problems you are having. Then, you can work together to find ways to help you manage them.  Your doctor may suggest you try talk therapy to help you live well with your ADHD.  What drugs may be needed?   The doctor may order drugs to:  Help lower your worry  Help you to pay attention  Help you be more calm  Help you be less impulsive  Help keep things in your life balanced  Care for low mood  Will physical activity be limited?   Physical activity will help keep your mind and body in good shape. Talk with your doctor about the right amount of activity for you.  What problems could happen?   Feeling badly about yourself  Raise the chances of you hurting yourself, such as injury in a car accident  Not do well in school and work  Trouble making friends or getting along well with others  Raise your chance to abuse  alcohol or drugs  What can be done to prevent this health problem?   The cause of ADHD is not clear, but to lower the chance of your child having ADHD:  Do not drink beer, wine, or mixed drinks (alcohol) while you are pregnant.  Do not smoke or use illegal drugs while you are pregnant.  Keep your child away from things like harmful toxins and chemicals.  Keep your child from having too much time in front of computers, TV, and video games.  Get plenty of exercise.  Be more aware of thoughts, emotions, and experiences each moment. This is mindfulness.  When do I need to call the doctor?   Drugs are not working  Symptoms are getting worse  Teach Back: Helping You Understand   The Teach Back Method helps you understand the information we are giving you. After you talk with the staff, tell them in your own words what you learned. This helps to make sure the staff has described each thing clearly. It also helps to explain things that may have been confusing. Before going home, make sure you can do these:  I can tell you about my condition.  I can tell you ways to help me pay attention.  I can tell you what I will do if I think my drugs are not working.  Where can I learn more?   HelpGuide.org  http://www.helpguide.org/articles/add-adhd/attention-deficit-disorder-adhd-parenting-tips.htm   KidsHealth  http://kidshealth.org/parent/medical/learning/adhd.html   National [place] of Mental Health  http://www.nimh.nih.gov/health/topics/attention-deficit-hyperactivity-disorder-adhd/index.shtml   Last Reviewed Date   2020-06-14  Consumer Information Use and Disclaimer   This information is not specific medical advice and does not replace information you receive from your health care provider. This is only a brief summary of general information. It does NOT include all information about conditions, illnesses, injuries, tests, procedures, treatments, therapies, discharge instructions or life-style choices that may apply to you. You  must talk with your health care provider for complete information about your health and treatment options. This information should not be used to decide whether or not to accept your health care providers advice, instructions or recommendations. Only your health care provider has the knowledge and training to provide advice that is right for you.  Copyright   Copyright © 2021 UpToDate, Inc. and its affiliates and/or licensors. All rights reserved.          Risks, benefits, and alternatives discussed with patient, Patient verbalized understanding of discussed plan of care. Asked patient if any further questions, answered no.    Future Appointments   Date Time Provider Department Center   6/19/2023  8:00 AM Latha Shelton NP [facility]  Ata Shelton NP

## 2023-03-09 NOTE — PATIENT INSTRUCTIONS
RTC in 3 months for F/U or sooner if needed.    Patient Education       Attention Deficit Hyperactivity Disorder (ADHD) Discharge Instructions   About this topic   Attention deficit hyperactivity disorder is also called ADHD or ADD. Most often, this starts at a young age. This disorder makes it hard to focus or sit still. People with ADHD may find it hard to make good decisions. Children with ADHD may have trouble in school and at home. Adults may have problems at work. People with ADHD may also have a problem getting along well with others. While there is no cure for ADHD, you and your doctor can work on a plan that is best for you to treat the signs of ADHD.  What care is needed at home?   Ask your doctor what you need to do when you go home. Make sure you ask questions if you do not understand what the doctor says. This way you will know what you need to do.  Try to get enough sleep at night. Most often adults need 7 to 8 hours each night and children need 8 to 10 hours each night. Rest during the day if you are tired.  Eat and sleep at the same times every day.  Have a plan for where to keep things in your home. Use checklists, things to help you remember, and alarms. A list of things you may need to find in a hurry can be helpful. These can help you put things in the right place and manage your time.  Work on getting better at reading and taking notes.  Have a space that is just for work or homework so you will be able to pay attention. This may be an office or a desk facing a wall. Try using headphones so you cannot hear the other noises.  Do one thing at a time. Keep a list of the next things you were planning to do or say.  Break large jobs or things you have to do into smaller jobs. This will make them easier to finish. Reward yourself along the way.  Have rules that are clear and easy to follow.   Look at where you are the strongest. Give rewards for good behavior, finished schoolwork, or for doing a good  job at work.  Do not do too many things that might cause stress.  What follow-up care is needed?   Your doctor may ask you to make visits to the office to check on your progress. Be sure to keep these visits.  Your doctor may send you to a special mental health doctor. This person will talk with you about the problems you are having. Then, you can work together to find ways to help you manage them.  Your doctor may suggest you try talk therapy to help you live well with your ADHD.  What drugs may be needed?   The doctor may order drugs to:  Help lower your worry  Help you to pay attention  Help you be more calm  Help you be less impulsive  Help keep things in your life balanced  Care for low mood  Will physical activity be limited?   Physical activity will help keep your mind and body in good shape. Talk with your doctor about the right amount of activity for you.  What problems could happen?   Feeling badly about yourself  Raise the chances of you hurting yourself, such as injury in a car accident  Not do well in school and work  Trouble making friends or getting along well with others  Raise your chance to abuse alcohol or drugs  What can be done to prevent this health problem?   The cause of ADHD is not clear, but to lower the chance of your child having ADHD:  Do not drink beer, wine, or mixed drinks (alcohol) while you are pregnant.  Do not smoke or use illegal drugs while you are pregnant.  Keep your child away from things like harmful toxins and chemicals.  Keep your child from having too much time in front of computers, TV, and video games.  Get plenty of exercise.  Be more aware of thoughts, emotions, and experiences each moment. This is mindfulness.  When do I need to call the doctor?   Drugs are not working  Symptoms are getting worse  Teach Back: Helping You Understand   The Teach Back Method helps you understand the information we are giving you. After you talk with the staff, tell them in your own  words what you learned. This helps to make sure the staff has described each thing clearly. It also helps to explain things that may have been confusing. Before going home, make sure you can do these:  I can tell you about my condition.  I can tell you ways to help me pay attention.  I can tell you what I will do if I think my drugs are not working.  Where can I learn more?   HelpGuide.org  http://www.helpguide.org/articles/add-adhd/attention-deficit-disorder-adhd-parenting-tips.htm   KidsHealth  http://kidshealth.org/parent/medical/learning/adhd.html   National [place] of Mental Health  http://www.[facility].nih.gov/health/topics/attention-deficit-hyperactivity-disorder-adhd/index.shtml   Last Reviewed Date   2020-06-14  Consumer Information Use and Disclaimer   This information is not specific medical advice and does not replace information you receive from your health care provider. This is only a brief summary of general information. It does NOT include all information about conditions, illnesses, injuries, tests, procedures, treatments, therapies, discharge instructions or life-style choices that may apply to you. You must talk with your health care provider for complete information about your health and treatment options. This information should not be used to decide whether or not to accept your health care providers advice, instructions or recommendations. Only your health care provider has the knowledge and training to provide advice that is right for you.  Copyright   Copyright © 2021 UpToDate, Inc. and its affiliates and/or licensors. All rights reserved.

## 2023-03-13 ENCOUNTER — TELEPHONE (OUTPATIENT)
Dept: PRIMARY CARE CLINIC | Facility: CLINIC | Age: 28
End: 2023-03-13
Payer: COMMERCIAL

## 2023-03-13 DIAGNOSIS — F90.2 ATTENTION DEFICIT HYPERACTIVITY DISORDER (ADHD), COMBINED TYPE: ICD-10-CM

## 2023-03-13 RX ORDER — DEXTROAMPHETAMINE SACCHARATE, AMPHETAMINE ASPARTATE, DEXTROAMPHETAMINE SULFATE AND AMPHETAMINE SULFATE 5; 5; 5; 5 MG/1; MG/1; MG/1; MG/1
1 TABLET ORAL 2 TIMES DAILY
Qty: 60 TABLET | Refills: 0 | Status: SHIPPED | OUTPATIENT
Start: 2023-03-13 | End: 2023-04-12 | Stop reason: SDUPTHER

## 2023-03-31 ENCOUNTER — PATIENT MESSAGE (OUTPATIENT)
Dept: FAMILY MEDICINE | Facility: CLINIC | Age: 28
End: 2023-03-31
Payer: COMMERCIAL

## 2023-04-12 DIAGNOSIS — F90.2 ATTENTION DEFICIT HYPERACTIVITY DISORDER (ADHD), COMBINED TYPE: ICD-10-CM

## 2023-04-12 RX ORDER — DEXTROAMPHETAMINE SACCHARATE, AMPHETAMINE ASPARTATE, DEXTROAMPHETAMINE SULFATE AND AMPHETAMINE SULFATE 5; 5; 5; 5 MG/1; MG/1; MG/1; MG/1
1 TABLET ORAL 2 TIMES DAILY
Qty: 60 TABLET | Refills: 0 | Status: SHIPPED | OUTPATIENT
Start: 2023-04-12 | End: 2023-05-30 | Stop reason: SDUPTHER

## 2023-04-12 NOTE — TELEPHONE ENCOUNTER
----- Message from Adriana Lisa sent at 4/12/2023 12:13 PM CDT -----  Contact: pt  Pt call for refill on dextroamphetamine-amphetamine (ADDERALL) 20 mg Pt can be reached at [phone]     [id number] [address]  Phone: [phone] Fax: [phone]    Thanks,

## 2023-05-17 DIAGNOSIS — F90.2 ATTENTION DEFICIT HYPERACTIVITY DISORDER (ADHD), COMBINED TYPE: ICD-10-CM

## 2023-05-17 RX ORDER — DEXTROAMPHETAMINE SACCHARATE, AMPHETAMINE ASPARTATE, DEXTROAMPHETAMINE SULFATE AND AMPHETAMINE SULFATE 7.5; 7.5; 7.5; 7.5 MG/1; MG/1; MG/1; MG/1
1 TABLET ORAL 2 TIMES DAILY
Qty: 60 TABLET | Refills: 0 | Status: SHIPPED | OUTPATIENT
Start: 2023-05-17 | End: 2023-06-19 | Stop reason: RX

## 2023-05-17 NOTE — TELEPHONE ENCOUNTER
----- Message from Malika Donaldson sent at 5/17/2023 12:27 PM CDT -----  Contact: Patient  Type:  RX Refill Request    Who Called: Bhavik Malone  Refill or New Rx:refill  RX Name and Strength:dextroamphetamine-amphetamine (ADDERALL) 20 mg tablet  How is the patient currently taking it? (ex. 1XDay):2xday   Is this a 30 day or 90 day RX:30  Preferred Pharmacy with phone number:[id number] [address] (Ph: [phone])  Local or Mail Order:local   Ordering Provider:Latha Shelton  Would the patient rather a call back or a response via [facility]? Call back  Best Call Back Number:[phone]  Additional Information:

## 2023-05-30 ENCOUNTER — TELEPHONE (OUTPATIENT)
Dept: PRIMARY CARE CLINIC | Facility: CLINIC | Age: 28
End: 2023-05-30
Payer: COMMERCIAL

## 2023-05-30 DIAGNOSIS — F90.2 ATTENTION DEFICIT HYPERACTIVITY DISORDER (ADHD), COMBINED TYPE: ICD-10-CM

## 2023-05-30 RX ORDER — DEXTROAMPHETAMINE SACCHARATE, AMPHETAMINE ASPARTATE, DEXTROAMPHETAMINE SULFATE AND AMPHETAMINE SULFATE 5; 5; 5; 5 MG/1; MG/1; MG/1; MG/1
1 TABLET ORAL 2 TIMES DAILY
Qty: 60 TABLET | Refills: 0 | Status: SHIPPED | OUTPATIENT
Start: 2023-05-30 | End: 2023-06-19 | Stop reason: RX

## 2023-05-30 NOTE — TELEPHONE ENCOUNTER
----- Message from Karine Padgett MA sent at 5/30/2023 11:46 AM CDT -----  Contact: Bhavik    ----- Message -----  From: Bhavya Bland  Sent: 5/30/2023  10:58 AM CDT  To: [facility]ARRER[facility] Staff    Bhavik stated he is needing the 20 mg of Adderall called in as they can't get the 30mg anymore. Please call him at [phone]

## 2023-05-31 ENCOUNTER — TELEPHONE (OUTPATIENT)
Dept: PRIMARY CARE CLINIC | Facility: CLINIC | Age: 28
End: 2023-05-31
Payer: COMMERCIAL

## 2023-05-31 NOTE — TELEPHONE ENCOUNTER
[2:55 PM] Yulissa Thurman, I have a caller on the phone line stating this is medication was called into [facility] and they do not have it for his adderall MRN [id number] Bhavik Tineo  and he is calling back stating that [facility] does not have it .  please advise

## 2023-06-19 ENCOUNTER — OFFICE VISIT (OUTPATIENT)
Dept: PRIMARY CARE CLINIC | Facility: CLINIC | Age: 28
End: 2023-06-19

## 2023-06-19 VITALS
OXYGEN SATURATION: 99 % | DIASTOLIC BLOOD PRESSURE: 83 MMHG | HEIGHT: 68 IN | WEIGHT: 142.19 LBS | BODY MASS INDEX: 21.55 KG/M2 | SYSTOLIC BLOOD PRESSURE: 136 MMHG | RESPIRATION RATE: 18 BRPM | HEART RATE: 77 BPM

## 2023-06-19 DIAGNOSIS — F90.2 ATTENTION DEFICIT HYPERACTIVITY DISORDER (ADHD), COMBINED TYPE: Primary | ICD-10-CM

## 2023-06-19 PROCEDURE — 99213 OFFICE O/P EST LOW 20 MIN: CPT | Mod: S$GLB,,, | Performed by: NURSE PRACTITIONER

## 2023-06-19 PROCEDURE — 99213 PR OFFICE/OUTPT VISIT, EST, LEVL III, 20-29 MIN: ICD-10-PCS | Mod: S$GLB,,, | Performed by: NURSE PRACTITIONER

## 2023-06-19 RX ORDER — LISDEXAMFETAMINE DIMESYLATE 50 MG/1
50 CAPSULE ORAL EVERY MORNING
Qty: 30 CAPSULE | Refills: 0 | Status: SHIPPED | OUTPATIENT
Start: 2023-07-17 | End: 2024-01-29

## 2023-06-19 RX ORDER — LISDEXAMFETAMINE DIMESYLATE 50 MG/1
50 CAPSULE ORAL EVERY MORNING
Qty: 30 CAPSULE | Refills: 0 | Status: SHIPPED | OUTPATIENT
Start: 2023-06-19 | End: 2023-10-31 | Stop reason: SDUPTHER

## 2023-06-19 RX ORDER — LISDEXAMFETAMINE DIMESYLATE 50 MG/1
50 CAPSULE ORAL EVERY MORNING
Qty: 30 CAPSULE | Refills: 0 | Status: SHIPPED | OUTPATIENT
Start: 2023-08-17 | End: 2024-01-29

## 2023-06-19 NOTE — PATIENT INSTRUCTIONS
RTC in 3 months for F/U or sooner if needed.    Patient Education       Attention Deficit Hyperactivity Disorder (ADHD) Discharge Instructions   About this topic   Attention deficit hyperactivity disorder is also called ADHD or ADD. Most often, this starts at a young age. This disorder makes it hard to focus or sit still. People with ADHD may find it hard to make good decisions. Children with ADHD may have trouble in school and at home. Adults may have problems at work. People with ADHD may also have a problem getting along well with others. While there is no cure for ADHD, you and your doctor can work on a plan that is best for you to treat the signs of ADHD.  What care is needed at home?   Ask your doctor what you need to do when you go home. Make sure you ask questions if you do not understand what the doctor says. This way you will know what you need to do.  Try to get enough sleep at night. Most often adults need 7 to 8 hours each night and children need 8 to 10 hours each night. Rest during the day if you are tired.  Eat and sleep at the same times every day.  Have a plan for where to keep things in your home. Use checklists, things to help you remember, and alarms. A list of things you may need to find in a hurry can be helpful. These can help you put things in the right place and manage your time.  Work on getting better at reading and taking notes.  Have a space that is just for work or homework so you will be able to pay attention. This may be an office or a desk facing a wall. Try using headphones so you cannot hear the other noises.  Do one thing at a time. Keep a list of the next things you were planning to do or say.  Break large jobs or things you have to do into smaller jobs. This will make them easier to finish. Reward yourself along the way.  Have rules that are clear and easy to follow.   Look at where you are the strongest. Give rewards for good behavior, finished schoolwork, or for doing a good  job at work.  Do not do too many things that might cause stress.  What follow-up care is needed?   Your doctor may ask you to make visits to the office to check on your progress. Be sure to keep these visits.  Your doctor may send you to a special mental health doctor. This person will talk with you about the problems you are having. Then, you can work together to find ways to help you manage them.  Your doctor may suggest you try talk therapy to help you live well with your ADHD.  What drugs may be needed?   The doctor may order drugs to:  Help lower your worry  Help you to pay attention  Help you be more calm  Help you be less impulsive  Help keep things in your life balanced  Care for low mood  Will physical activity be limited?   Physical activity will help keep your mind and body in good shape. Talk with your doctor about the right amount of activity for you.  What problems could happen?   Feeling badly about yourself  Raise the chances of you hurting yourself, such as injury in a car accident  Not do well in school and work  Trouble making friends or getting along well with others  Raise your chance to abuse alcohol or drugs  What can be done to prevent this health problem?   The cause of ADHD is not clear, but to lower the chance of your child having ADHD:  Do not drink beer, wine, or mixed drinks (alcohol) while you are pregnant.  Do not smoke or use illegal drugs while you are pregnant.  Keep your child away from things like harmful toxins and chemicals.  Keep your child from having too much time in front of computers, TV, and video games.  Get plenty of exercise.  Be more aware of thoughts, emotions, and experiences each moment. This is mindfulness.  When do I need to call the doctor?   Drugs are not working  Symptoms are getting worse  Teach Back: Helping You Understand   The Teach Back Method helps you understand the information we are giving you. After you talk with the staff, tell them in your own  words what you learned. This helps to make sure the staff has described each thing clearly. It also helps to explain things that may have been confusing. Before going home, make sure you can do these:  I can tell you about my condition.  I can tell you ways to help me pay attention.  I can tell you what I will do if I think my drugs are not working.  Where can I learn more?   HelpGuide.org  http://www.helpguide.org/articles/add-adhd/attention-deficit-disorder-adhd-parenting-tips.htm   KidsHealth  http://kidshealth.org/parent/medical/learning/adhd.html   [place] [employer] [place] [employer]  http://www.[facility].nih.gov/health/topics/attention-deficit-hyperactivity-disorder-adhd/index.shtml   Last Reviewed Date   2020-06-14  Consumer Information Use and Disclaimer   This information is not specific medical advice and does not replace information you receive from your health care provider. This is only a brief summary of general information. It does NOT include all information about conditions, illnesses, injuries, tests, procedures, treatments, therapies, discharge instructions or life-style choices that may apply to you. You must talk with your health care provider for complete information about your health and treatment options. This information should not be used to decide whether or not to accept your health care providers advice, instructions or recommendations. Only your health care provider has the knowledge and training to provide advice that is right for you.  Copyright   Copyright © 2021 [employer] and its affiliates and/or licensors. All rights reserved.

## 2023-06-19 NOTE — PROGRESS NOTES
Subjective:       Patient ID: Bhavik Malone is a 28 y.o. male.    Chief Complaint: Follow-up    HPI: Bhavik is a 28 y.o. male who presents for 3 month ADHD F/U.    Annual visit ordered at last visit but he has been unable to have them drawn due to him being self pay at the current time due to him being unemployed. He plans to get them drawn once he starts back working and gets insurance again.    Feels well today but would like to switch to Vyvanse due to the Adderall being unavailable at multiple pharmacies. ADHD - usually controlled with Adderall BID but he has been out of the medication for two months due to it being unavailable.     He does have a history of hemorrhoids which he uses preparation H when needed and has started eating a high fiber diet.    Smokes 1/2 ppd of cigarettes daily. Refuses smoking cessation.    Refuses all vaccines.                History reviewed. No pertinent past medical history.    History reviewed. No pertinent surgical history.    History reviewed. No pertinent family history.    Social History     Tobacco Use    Smoking status: Light Smoker    Smokeless tobacco: Current   Substance Use Topics    Alcohol use: Yes    Drug use: Never       Patient Active Problem List   Diagnosis    Attention deficit hyperactivity disorder (ADHD), combined type    Other hemorrhoids         There is no immunization history on file for this patient.        Review of Systems   Constitutional:  Negative for activity change, appetite change, chills, diaphoresis, fatigue and fever.   HENT:  Negative for tinnitus and trouble swallowing.    Eyes:  Negative for visual disturbance.   Respiratory:  Negative for cough, chest tightness, shortness of breath and wheezing.    Cardiovascular:  Negative for chest pain, palpitations and leg swelling.   Gastrointestinal:  Negative for abdominal distention and abdominal pain.   Genitourinary:  Negative for dysuria, frequency, hematuria and urgency.   Musculoskeletal:   "Negative for gait problem.   Skin:  Negative for color change and rash.   Neurological:  Negative for dizziness, speech difficulty, weakness, light-headedness, numbness and headaches.   Psychiatric/Behavioral:  Positive for decreased concentration. Negative for behavioral problems, confusion, dysphoric mood and suicidal ideas. The patient is nervous/anxious.    Objective:     Vitals:    06/19/23 0831   BP: 136/83   BP Location: Right leg   Patient Position: Sitting   BP Method: Large (Automatic)   Pulse: 77   Resp: 18   SpO2: 99%   Weight: 64.5 kg (142 lb 3.2 oz)   Height: 5' 8" (1.727 m)       Physical Exam  Vitals and nursing note reviewed.   Constitutional:       General: He is not in acute distress.     Appearance: He is not diaphoretic.   HENT:      Head: Normocephalic and atraumatic.      Mouth/Throat:      Mouth: Mucous membranes are moist.      Pharynx: Oropharynx is clear.   Eyes:      Extraocular Movements: Extraocular movements intact.      Conjunctiva/sclera: Conjunctivae normal.   Cardiovascular:      Rate and Rhythm: Normal rate and regular rhythm.   Pulmonary:      Effort: Pulmonary effort is normal.      Breath sounds: Normal breath sounds.   Abdominal:      General: There is no distension.      Tenderness: There is no abdominal tenderness.   Musculoskeletal:         General: Normal range of motion.      Cervical back: Normal range of motion.      Right lower leg: No edema.      Left lower leg: No edema.   Lymphadenopathy:      Cervical: No cervical adenopathy.   Skin:     General: Skin is warm and dry.      Capillary Refill: Capillary refill takes less than 2 seconds.   Neurological:      Mental Status: He is alert and oriented to person, place, and time.   Psychiatric:         Mood and Affect: Mood and affect normal.         Behavior: Behavior normal. Behavior is cooperative.         Thought Content: Thought content normal.         Judgment: Judgment normal.       No visits with results within 6 " Month(s) from this visit.   Latest known visit with results is:   No results found for any previous visit.         Assessment:      1. Attention deficit hyperactivity disorder (ADHD), combined type          Plan:     Attention deficit hyperactivity disorder (ADHD), combined type  Comments:  Switched to Vyvanse 50 mg daily due to Adderall being unavailable. F/U in 3 months.  Orders:  -     lisdexamfetamine (VYVANSE) 50 MG capsule; Take 1 capsule (50 mg total) by mouth every morning.  Dispense: 30 capsule; Refill: 0  -     lisdexamfetamine (VYVANSE) 50 MG capsule; Take 1 capsule (50 mg total) by mouth every morning.  Dispense: 30 capsule; Refill: 0  -     lisdexamfetamine (VYVANSE) 50 MG capsule; Take 1 capsule (50 mg total) by mouth every morning.  Dispense: 30 capsule; Refill: 0         Current Outpatient Medications   Medication Sig Dispense Refill    lisdexamfetamine (VYVANSE) 50 MG capsule Take 1 capsule (50 mg total) by mouth every morning. 30 capsule 0    [START ON 7/17/2023] lisdexamfetamine (VYVANSE) 50 MG capsule Take 1 capsule (50 mg total) by mouth every morning. 30 capsule 0    [START ON 8/17/2023] lisdexamfetamine (VYVANSE) 50 MG capsule Take 1 capsule (50 mg total) by mouth every morning. 30 capsule 0     No current facility-administered medications for this visit.       Medications Discontinued During This Encounter   Medication Reason    dextroamphetamine-amphetamine (ADDERALL) 20 mg tablet Availability    dextroamphetamine-amphetamine (ADDERALL) 30 mg Tab Availability    dextroamphetamine-amphetamine (ADDERALL) 30 mg Tab Availability    dextroamphetamine-amphetamine (ADDERALL) 30 mg Tab Availability       Health Maintenance   Topic Date Due    Hepatitis C Screening  Never done    Lipid Panel  Never done    TETANUS VACCINE  Never done       Patient Instructions   RTC in 3 months for F/U or sooner if needed.    Patient Education       Attention Deficit Hyperactivity Disorder (ADHD) Discharge  Instructions   About this topic   Attention deficit hyperactivity disorder is also called ADHD or ADD. Most often, this starts at a young age. This disorder makes it hard to focus or sit still. People with ADHD may find it hard to make good decisions. Children with ADHD may have trouble in school and at home. Adults may have problems at work. People with ADHD may also have a problem getting along well with others. While there is no cure for ADHD, you and your doctor can work on a plan that is best for you to treat the signs of ADHD.  What care is needed at home?   Ask your doctor what you need to do when you go home. Make sure you ask questions if you do not understand what the doctor says. This way you will know what you need to do.  Try to get enough sleep at night. Most often adults need 7 to 8 hours each night and children need 8 to 10 hours each night. Rest during the day if you are tired.  Eat and sleep at the same times every day.  Have a plan for where to keep things in your home. Use checklists, things to help you remember, and alarms. A list of things you may need to find in a hurry can be helpful. These can help you put things in the right place and manage your time.  Work on getting better at reading and taking notes.  Have a space that is just for work or homework so you will be able to pay attention. This may be an office or a desk facing a wall. Try using headphones so you cannot hear the other noises.  Do one thing at a time. Keep a list of the next things you were planning to do or say.  Break large jobs or things you have to do into smaller jobs. This will make them easier to finish. Reward yourself along the way.  Have rules that are clear and easy to follow.   Look at where you are the strongest. Give rewards for good behavior, finished schoolwork, or for doing a good job at work.  Do not do too many things that might cause stress.  What follow-up care is needed?   Your doctor may ask you to make  visits to the office to check on your progress. Be sure to keep these visits.  Your doctor may send you to a special mental health doctor. This person will talk with you about the problems you are having. Then, you can work together to find ways to help you manage them.  Your doctor may suggest you try talk therapy to help you live well with your ADHD.  What drugs may be needed?   The doctor may order drugs to:  Help lower your worry  Help you to pay attention  Help you be more calm  Help you be less impulsive  Help keep things in your life balanced  Care for low mood  Will physical activity be limited?   Physical activity will help keep your mind and body in good shape. Talk with your doctor about the right amount of activity for you.  What problems could happen?   Feeling badly about yourself  Raise the chances of you hurting yourself, such as injury in a car accident  Not do well in school and work  Trouble making friends or getting along well with others  Raise your chance to abuse alcohol or drugs  What can be done to prevent this health problem?   The cause of ADHD is not clear, but to lower the chance of your child having ADHD:  Do not drink beer, wine, or mixed drinks (alcohol) while you are pregnant.  Do not smoke or use illegal drugs while you are pregnant.  Keep your child away from things like harmful toxins and chemicals.  Keep your child from having too much time in front of computers, TV, and video games.  Get plenty of exercise.  Be more aware of thoughts, emotions, and experiences each moment. This is mindfulness.  When do I need to call the doctor?   Drugs are not working  Symptoms are getting worse  Teach Back: Helping You Understand   The Teach Back Method helps you understand the information we are giving you. After you talk with the staff, tell them in your own words what you learned. This helps to make sure the staff has described each thing clearly. It also helps to explain things that may  have been confusing. Before going home, make sure you can do these:  I can tell you about my condition.  I can tell you ways to help me pay attention.  I can tell you what I will do if I think my drugs are not working.  Where can I learn more?   HelpGuide.org  http://www.helpguide.org/articles/add-adhd/attention-deficit-disorder-adhd-parenting-tips.htm   KidsHealth  http://kidshealth.org/parent/medical/learning/adhd.html   National [place] of Mental Health  http://www.[facility].nih.gov/health/topics/attention-deficit-hyperactivity-disorder-adhd/index.shtml   Last Reviewed Date   2020-06-14  Consumer Information Use and Disclaimer   This information is not specific medical advice and does not replace information you receive from your health care provider. This is only a brief summary of general information. It does NOT include all information about conditions, illnesses, injuries, tests, procedures, treatments, therapies, discharge instructions or life-style choices that may apply to you. You must talk with your health care provider for complete information about your health and treatment options. This information should not be used to decide whether or not to accept your health care providers advice, instructions or recommendations. Only your health care provider has the knowledge and training to provide advice that is right for you.  Copyright   Copyright © 2021 [employer] and its affiliates and/or licensors. All rights reserved.      Risks, benefits, and alternatives discussed with patient, Patient verbalized understanding of discussed plan of care. Asked patient if any further questions, answered no.    Future Appointments   Date Time Provider Department Center   9/20/2023 11:00 AM Latha Shelton NP [id number]  Mandeep Shelton NP

## 2023-07-10 ENCOUNTER — TELEPHONE (OUTPATIENT)
Dept: PRIMARY CARE CLINIC | Facility: CLINIC | Age: 28
End: 2023-07-10

## 2023-07-10 DIAGNOSIS — F90.2 ATTENTION DEFICIT HYPERACTIVITY DISORDER (ADHD), COMBINED TYPE: Primary | ICD-10-CM

## 2023-07-10 RX ORDER — DEXTROAMPHETAMINE SACCHARATE, AMPHETAMINE ASPARTATE MONOHYDRATE, DEXTROAMPHETAMINE SULFATE AND AMPHETAMINE SULFATE 7.5; 7.5; 7.5; 7.5 MG/1; MG/1; MG/1; MG/1
30 CAPSULE, EXTENDED RELEASE ORAL EVERY MORNING
Qty: 30 CAPSULE | Refills: 0 | Status: SHIPPED | OUTPATIENT
Start: 2023-07-10 | End: 2023-08-14 | Stop reason: SDUPTHER

## 2023-08-14 DIAGNOSIS — F90.2 ATTENTION DEFICIT HYPERACTIVITY DISORDER (ADHD), COMBINED TYPE: ICD-10-CM

## 2023-08-15 RX ORDER — DEXTROAMPHETAMINE SACCHARATE, AMPHETAMINE ASPARTATE MONOHYDRATE, DEXTROAMPHETAMINE SULFATE AND AMPHETAMINE SULFATE 7.5; 7.5; 7.5; 7.5 MG/1; MG/1; MG/1; MG/1
30 CAPSULE, EXTENDED RELEASE ORAL EVERY MORNING
Qty: 30 CAPSULE | Refills: 0 | Status: SHIPPED | OUTPATIENT
Start: 2023-08-15 | End: 2023-09-14 | Stop reason: SDUPTHER

## 2023-09-14 ENCOUNTER — TELEPHONE (OUTPATIENT)
Dept: PRIMARY CARE CLINIC | Facility: CLINIC | Age: 28
End: 2023-09-14

## 2023-09-14 DIAGNOSIS — F90.2 ATTENTION DEFICIT HYPERACTIVITY DISORDER (ADHD), COMBINED TYPE: ICD-10-CM

## 2023-09-14 RX ORDER — DEXTROAMPHETAMINE SACCHARATE, AMPHETAMINE ASPARTATE MONOHYDRATE, DEXTROAMPHETAMINE SULFATE AND AMPHETAMINE SULFATE 7.5; 7.5; 7.5; 7.5 MG/1; MG/1; MG/1; MG/1
30 CAPSULE, EXTENDED RELEASE ORAL EVERY MORNING
Qty: 30 CAPSULE | Refills: 0 | Status: SHIPPED | OUTPATIENT
Start: 2023-09-14 | End: 2023-09-25 | Stop reason: SDUPTHER

## 2023-09-14 NOTE — TELEPHONE ENCOUNTER
----- Message from Frannie Khan LPN sent at 9/14/2023  2:02 PM CDT -----  Contact: pt  Says only requesting a refill on Aderall, but chart says he also takes Vyvanse??  ----- Message -----  From: Adriana Lisa  Sent: 9/14/2023  12:53 PM CDT  To: Nikita BARRERA Staff    Pt calling for refill on dextroamphetamine-amphetamine (ADDERALL XR) 30 MG Pt can be reached at [phone]       [id number] [address] [address]  Phone: [phone] Fax: [phone]

## 2023-09-18 ENCOUNTER — TELEPHONE (OUTPATIENT)
Dept: PRIMARY CARE CLINIC | Facility: CLINIC | Age: 28
End: 2023-09-18

## 2023-09-18 NOTE — TELEPHONE ENCOUNTER
----- Message from Meron Giuseppe sent at 9/18/2023 12:52 PM CDT -----  Type:  Needs Medical Advice    Who Called: Bhavik Malone'  Symptoms (please be specific): -   How long has patient had these symptoms:  -  Pharmacy name and phone #:    [id number] - [facility] [address] [address]  Phone: [phone] Fax: [phone]      Would the patient rather a call back or a response via [facility]?    Best Call Back Number: [phone]    Additional Information: pt wants to know if his medication dextroamphetamine-amphetamine (ADDERALL XR) 30 MG 24 hr capsule can be called in please call

## 2023-09-25 DIAGNOSIS — F90.2 ATTENTION DEFICIT HYPERACTIVITY DISORDER (ADHD), COMBINED TYPE: ICD-10-CM

## 2023-09-25 RX ORDER — DEXTROAMPHETAMINE SACCHARATE, AMPHETAMINE ASPARTATE MONOHYDRATE, DEXTROAMPHETAMINE SULFATE AND AMPHETAMINE SULFATE 7.5; 7.5; 7.5; 7.5 MG/1; MG/1; MG/1; MG/1
30 CAPSULE, EXTENDED RELEASE ORAL EVERY MORNING
Qty: 30 CAPSULE | Refills: 0 | Status: SHIPPED | OUTPATIENT
Start: 2023-09-25 | End: 2023-11-06 | Stop reason: SDUPTHER

## 2023-10-31 ENCOUNTER — OFFICE VISIT (OUTPATIENT)
Dept: PRIMARY CARE CLINIC | Facility: CLINIC | Age: 28
End: 2023-10-31
Payer: COMMERCIAL

## 2023-10-31 VITALS
HEART RATE: 92 BPM | OXYGEN SATURATION: 99 % | DIASTOLIC BLOOD PRESSURE: 78 MMHG | RESPIRATION RATE: 16 BRPM | WEIGHT: 148 LBS | HEIGHT: 68 IN | SYSTOLIC BLOOD PRESSURE: 132 MMHG | BODY MASS INDEX: 22.43 KG/M2

## 2023-10-31 DIAGNOSIS — F90.2 ATTENTION DEFICIT HYPERACTIVITY DISORDER (ADHD), COMBINED TYPE: Primary | ICD-10-CM

## 2023-10-31 PROCEDURE — 1159F PR MEDICATION LIST DOCUMENTED IN MEDICAL RECORD: ICD-10-PCS | Mod: CPTII,S$GLB,, | Performed by: NURSE PRACTITIONER

## 2023-10-31 PROCEDURE — 1160F PR REVIEW ALL MEDS BY PRESCRIBER/CLIN PHARMACIST DOCUMENTED: ICD-10-PCS | Mod: CPTII,S$GLB,, | Performed by: NURSE PRACTITIONER

## 2023-10-31 PROCEDURE — 1160F RVW MEDS BY RX/DR IN RCRD: CPT | Mod: CPTII,S$GLB,, | Performed by: NURSE PRACTITIONER

## 2023-10-31 PROCEDURE — 99214 OFFICE O/P EST MOD 30 MIN: CPT | Mod: S$GLB,,, | Performed by: NURSE PRACTITIONER

## 2023-10-31 PROCEDURE — 3078F DIAST BP <80 MM HG: CPT | Mod: CPTII,S$GLB,, | Performed by: NURSE PRACTITIONER

## 2023-10-31 PROCEDURE — 3075F SYST BP GE 130 - 139MM HG: CPT | Mod: CPTII,S$GLB,, | Performed by: NURSE PRACTITIONER

## 2023-10-31 PROCEDURE — 3075F PR MOST RECENT SYSTOLIC BLOOD PRESS GE 130-139MM HG: ICD-10-PCS | Mod: CPTII,S$GLB,, | Performed by: NURSE PRACTITIONER

## 2023-10-31 PROCEDURE — 3008F BODY MASS INDEX DOCD: CPT | Mod: CPTII,S$GLB,, | Performed by: NURSE PRACTITIONER

## 2023-10-31 PROCEDURE — 1159F MED LIST DOCD IN RCRD: CPT | Mod: CPTII,S$GLB,, | Performed by: NURSE PRACTITIONER

## 2023-10-31 PROCEDURE — 3078F PR MOST RECENT DIASTOLIC BLOOD PRESSURE < 80 MM HG: ICD-10-PCS | Mod: CPTII,S$GLB,, | Performed by: NURSE PRACTITIONER

## 2023-10-31 PROCEDURE — 3008F PR BODY MASS INDEX (BMI) DOCUMENTED: ICD-10-PCS | Mod: CPTII,S$GLB,, | Performed by: NURSE PRACTITIONER

## 2023-10-31 PROCEDURE — 99214 PR OFFICE/OUTPT VISIT, EST, LEVL IV, 30-39 MIN: ICD-10-PCS | Mod: S$GLB,,, | Performed by: NURSE PRACTITIONER

## 2023-10-31 RX ORDER — LISDEXAMFETAMINE DIMESYLATE 50 MG/1
50 CAPSULE ORAL EVERY MORNING
Qty: 30 CAPSULE | Refills: 0 | Status: SHIPPED | OUTPATIENT
Start: 2023-10-31 | End: 2024-01-29

## 2023-10-31 NOTE — PROGRESS NOTES
Subjective:       Patient ID: Bhavik Malone is a 28 y.o. male.    Chief Complaint: Annual Exam    HPI: Bhavik is a 28 y.o. male who presents for F/U.    Feels well today with no new issues or concerns.    Annual labs ordered earlier this year but he has not had a chance to have them drawn yet due to his work schedule. Says he will have them drawn soon.     ADHD - was controlled with Adderall but he would like to switch back to Vyvanse 50 mg daily. Tolerating medication well, no s/e of CP, palpitations, syncope, actually feels much better, able to stay focused and concentrate throughout the day. Able to complete tasks within timely manner.  Denies se/ar to ADHD medication.     He does have a history of hemorrhoids which he uses preparation H when needed and has started eating a high fiber diet.    Smokes 1/2 ppd of cigarettes daily. Refuses smoking cessation.    Refuses all vaccines.                  History reviewed. No pertinent past medical history.    History reviewed. No pertinent surgical history.    History reviewed. No pertinent family history.    Social History     Tobacco Use    Smoking status: Light Smoker    Smokeless tobacco: Current   Substance Use Topics    Alcohol use: Yes    Drug use: Never       Patient Active Problem List   Diagnosis    Attention deficit hyperactivity disorder (ADHD), combined type    Other hemorrhoids         There is no immunization history on file for this patient.        Review of Systems   Constitutional:  Negative for activity change, appetite change, chills, diaphoresis, fatigue and fever.   HENT:  Negative for tinnitus and trouble swallowing.    Eyes:  Negative for visual disturbance.   Respiratory:  Negative for cough, chest tightness, shortness of breath and wheezing.    Cardiovascular:  Negative for chest pain, palpitations and leg swelling.   Gastrointestinal:  Negative for abdominal distention and abdominal pain.   Genitourinary:  Negative for dysuria, frequency,  "hematuria and urgency.   Musculoskeletal:  Negative for gait problem.   Skin:  Negative for color change and rash.   Neurological:  Negative for dizziness, speech difficulty, weakness, light-headedness, numbness and headaches.   Psychiatric/Behavioral:  Negative for behavioral problems, confusion, decreased concentration and dysphoric mood. The patient is not nervous/anxious.      Objective:     Vitals:    10/31/23 1600   BP: 132/78   BP Location: Right arm   Patient Position: Sitting   BP Method: Medium (Automatic)   Pulse: 92   Resp: 16   SpO2: 99%   Weight: 67.1 kg (148 lb)   Height: 5' 8" (1.727 m)       Physical Exam  Vitals and nursing note reviewed.   Constitutional:       General: He is not in acute distress.     Appearance: He is not diaphoretic.   HENT:      Head: Normocephalic and atraumatic.      Mouth/Throat:      Mouth: Mucous membranes are moist.      Pharynx: Oropharynx is clear.   Eyes:      Extraocular Movements: Extraocular movements intact.      Conjunctiva/sclera: Conjunctivae normal.   Cardiovascular:      Rate and Rhythm: Normal rate and regular rhythm.      Heart sounds: Normal heart sounds.   Pulmonary:      Effort: Pulmonary effort is normal.      Breath sounds: Normal breath sounds. No stridor. No wheezing or rales.   Abdominal:      General: There is no distension.      Tenderness: There is no abdominal tenderness.   Musculoskeletal:         General: No tenderness. Normal range of motion.      Cervical back: Normal range of motion.      Right lower leg: No edema.      Left lower leg: No edema.   Lymphadenopathy:      Cervical: No cervical adenopathy.   Skin:     General: Skin is warm and dry.      Findings: No rash.   Neurological:      Mental Status: He is alert and oriented to person, place, and time.   Psychiatric:         Mood and Affect: Mood and affect normal.         Behavior: Behavior normal. Behavior is cooperative.         No visits with results within 6 Month(s) from this " visit.   Latest known visit with results is:   No results found for any previous visit.         Assessment:      1. Attention deficit hyperactivity disorder (ADHD), combined type Well controlled         Plan:     Attention deficit hyperactivity disorder (ADHD), combined type  Comments:  Vyvanse 50 mg ordered, F/U in 3 months.  Orders:  -     lisdexamfetamine (VYVANSE) 50 MG capsule; Take 1 capsule (50 mg total) by mouth every morning.  Dispense: 30 capsule; Refill: 0         Current Outpatient Medications   Medication Sig Dispense Refill    dextroamphetamine-amphetamine (ADDERALL XR) 30 MG 24 hr capsule Take 1 capsule (30 mg total) by mouth every morning. 30 capsule 0    lisdexamfetamine (VYVANSE) 50 MG capsule Take 1 capsule (50 mg total) by mouth every morning. 30 capsule 0    lisdexamfetamine (VYVANSE) 50 MG capsule Take 1 capsule (50 mg total) by mouth every morning. 30 capsule 0    lisdexamfetamine (VYVANSE) 50 MG capsule Take 1 capsule (50 mg total) by mouth every morning. 30 capsule 0     No current facility-administered medications for this visit.       Medications Discontinued During This Encounter   Medication Reason    lisdexamfetamine (VYVANSE) 50 MG capsule Reorder       Health Maintenance   Topic Date Due    Hepatitis C Screening  Never done    Lipid Panel  Never done    TETANUS VACCINE  Never done       Patient Instructions   RTC in 3 months for F/U or sooner if needed.    Patient Education       Attention Deficit Hyperactivity Disorder (ADHD) Discharge Instructions   About this topic   Attention deficit hyperactivity disorder is also called ADHD or ADD. Most often, this starts at a young age. This disorder makes it hard to focus or sit still. People with ADHD may find it hard to make good decisions. Children with ADHD may have trouble in school and at home. Adults may have problems at work. People with ADHD may also have a problem getting along well with others. While there is no cure for ADHD, you  and your doctor can work on a plan that is best for you to treat the signs of ADHD.  What care is needed at home?   Ask your doctor what you need to do when you go home. Make sure you ask questions if you do not understand what the doctor says. This way you will know what you need to do.  Try to get enough sleep at night. Most often adults need 7 to 8 hours each night and children need 8 to 10 hours each night. Rest during the day if you are tired.  Eat and sleep at the same times every day.  Have a plan for where to keep things in your home. Use checklists, things to help you remember, and alarms. A list of things you may need to find in a hurry can be helpful. These can help you put things in the right place and manage your time.  Work on getting better at reading and taking notes.  Have a space that is just for work or homework so you will be able to pay attention. This may be an office or a desk facing a wall. Try using headphones so you cannot hear the other noises.  Do one thing at a time. Keep a list of the next things you were planning to do or say.  Break large jobs or things you have to do into smaller jobs. This will make them easier to finish. Reward yourself along the way.  Have rules that are clear and easy to follow.   Look at where you are the strongest. Give rewards for good behavior, finished schoolwork, or for doing a good job at work.  Do not do too many things that might cause stress.  What follow-up care is needed?   Your doctor may ask you to make visits to the office to check on your progress. Be sure to keep these visits.  Your doctor may send you to a special mental health doctor. This person will talk with you about the problems you are having. Then, you can work together to find ways to help you manage them.  Your doctor may suggest you try talk therapy to help you live well with your ADHD.  What drugs may be needed?   The doctor may order drugs to:  Help lower your worry  Help you to pay  attention  Help you be more calm  Help you be less impulsive  Help keep things in your life balanced  Care for low mood  Will physical activity be limited?   Physical activity will help keep your mind and body in good shape. Talk with your doctor about the right amount of activity for you.  What problems could happen?   Feeling badly about yourself  Raise the chances of you hurting yourself, such as injury in a car accident  Not do well in school and work  Trouble making friends or getting along well with others  Raise your chance to abuse alcohol or drugs  What can be done to prevent this health problem?   The cause of ADHD is not clear, but to lower the chance of your child having ADHD:  Do not drink beer, wine, or mixed drinks (alcohol) while you are pregnant.  Do not smoke or use illegal drugs while you are pregnant.  Keep your child away from things like harmful toxins and chemicals.  Keep your child from having too much time in front of computers, TV, and video games.  Get plenty of exercise.  Be more aware of thoughts, emotions, and experiences each moment. This is mindfulness.  When do I need to call the doctor?   Drugs are not working  Symptoms are getting worse  Teach Back: Helping You Understand   The Teach Back Method helps you understand the information we are giving you. After you talk with the staff, tell them in your own words what you learned. This helps to make sure the staff has described each thing clearly. It also helps to explain things that may have been confusing. Before going home, make sure you can do these:  I can tell you about my condition.  I can tell you ways to help me pay attention.  I can tell you what I will do if I think my drugs are not working.  Where can I learn more?   HelpGuide.org  http://www.helpguide.org/articles/add-adhd/attention-deficit-disorder-adhd-parenting-tips.htm   KidsHealth  http://kidshealth.org/parent/medical/learning/adhd.html   National [place] of Mental  Health  http://www.[facility].nih.gov/health/topics/attention-deficit-hyperactivity-disorder-adhd/index.shtml   Last Reviewed Date   2020-06-14  Consumer Information Use and Disclaimer   This information is not specific medical advice and does not replace information you receive from your health care provider. This is only a brief summary of general information. It does NOT include all information about conditions, illnesses, injuries, tests, procedures, treatments, therapies, discharge instructions or life-style choices that may apply to you. You must talk with your health care provider for complete information about your health and treatment options. This information should not be used to decide whether or not to accept your health care providers advice, instructions or recommendations. Only your health care provider has the knowledge and training to provide advice that is right for you.  Copyright   Copyright © 2021 [employer] and its affiliates and/or licensors. All rights reserved.       Risks, benefits, and alternatives discussed with patient, Patient verbalized understanding of discussed plan of care. Asked patient if any further questions, answered no.    Future Appointments   Date Time Provider Department Center   1/29/2024  1:00 PM Latha Shelton, NP [id number] Mandeep Shelton NP

## 2023-10-31 NOTE — PATIENT INSTRUCTIONS
RTC in 3 months for F/U or sooner if needed.    Patient Education       Attention Deficit Hyperactivity Disorder (ADHD) Discharge Instructions   About this topic   Attention deficit hyperactivity disorder is also called ADHD or ADD. Most often, this starts at a young age. This disorder makes it hard to focus or sit still. People with ADHD may find it hard to make good decisions. Children with ADHD may have trouble in school and at home. Adults may have problems at work. People with ADHD may also have a problem getting along well with others. While there is no cure for ADHD, you and your doctor can work on a plan that is best for you to treat the signs of ADHD.  What care is needed at home?   Ask your doctor what you need to do when you go home. Make sure you ask questions if you do not understand what the doctor says. This way you will know what you need to do.  Try to get enough sleep at night. Most often adults need 7 to 8 hours each night and children need 8 to 10 hours each night. Rest during the day if you are tired.  Eat and sleep at the same times every day.  Have a plan for where to keep things in your home. Use checklists, things to help you remember, and alarms. A list of things you may need to find in a hurry can be helpful. These can help you put things in the right place and manage your time.  Work on getting better at reading and taking notes.  Have a space that is just for work or homework so you will be able to pay attention. This may be an office or a desk facing a wall. Try using headphones so you cannot hear the other noises.  Do one thing at a time. Keep a list of the next things you were planning to do or say.  Break large jobs or things you have to do into smaller jobs. This will make them easier to finish. Reward yourself along the way.  Have rules that are clear and easy to follow.   Look at where you are the strongest. Give rewards for good behavior, finished schoolwork, or for doing a good  job at work.  Do not do too many things that might cause stress.  What follow-up care is needed?   Your doctor may ask you to make visits to the office to check on your progress. Be sure to keep these visits.  Your doctor may send you to a special mental health doctor. This person will talk with you about the problems you are having. Then, you can work together to find ways to help you manage them.  Your doctor may suggest you try talk therapy to help you live well with your ADHD.  What drugs may be needed?   The doctor may order drugs to:  Help lower your worry  Help you to pay attention  Help you be more calm  Help you be less impulsive  Help keep things in your life balanced  Care for low mood  Will physical activity be limited?   Physical activity will help keep your mind and body in good shape. Talk with your doctor about the right amount of activity for you.  What problems could happen?   Feeling badly about yourself  Raise the chances of you hurting yourself, such as injury in a car accident  Not do well in school and work  Trouble making friends or getting along well with others  Raise your chance to abuse alcohol or drugs  What can be done to prevent this health problem?   The cause of ADHD is not clear, but to lower the chance of your child having ADHD:  Do not drink beer, wine, or mixed drinks (alcohol) while you are pregnant.  Do not smoke or use illegal drugs while you are pregnant.  Keep your child away from things like harmful toxins and chemicals.  Keep your child from having too much time in front of computers, TV, and video games.  Get plenty of exercise.  Be more aware of thoughts, emotions, and experiences each moment. This is mindfulness.  When do I need to call the doctor?   Drugs are not working  Symptoms are getting worse  Teach Back: Helping You Understand   The Teach Back Method helps you understand the information we are giving you. After you talk with the staff, tell them in your own  words what you learned. This helps to make sure the staff has described each thing clearly. It also helps to explain things that may have been confusing. Before going home, make sure you can do these:  I can tell you about my condition.  I can tell you ways to help me pay attention.  I can tell you what I will do if I think my drugs are not working.  Where can I learn more?   HelpGuide.org  http://www.helpguide.org/articles/add-adhd/attention-deficit-disorder-adhd-parenting-tips.htm   KidsHealth  http://kidshealth.org/parent/medical/learning/adhd.html   National [place] of Mental Health  http://www.[facility].nih.gov/health/topics/attention-deficit-hyperactivity-disorder-adhd/index.shtml   Last Reviewed Date   2020-06-14  Consumer Information Use and Disclaimer   This information is not specific medical advice and does not replace information you receive from your health care provider. This is only a brief summary of general information. It does NOT include all information about conditions, illnesses, injuries, tests, procedures, treatments, therapies, discharge instructions or life-style choices that may apply to you. You must talk with your health care provider for complete information about your health and treatment options. This information should not be used to decide whether or not to accept your health care providers advice, instructions or recommendations. Only your health care provider has the knowledge and training to provide advice that is right for you.  Copyright   Copyright © 2021 [employer] and its affiliates and/or licensors. All rights reserved.

## 2023-11-06 DIAGNOSIS — F90.2 ATTENTION DEFICIT HYPERACTIVITY DISORDER (ADHD), COMBINED TYPE: ICD-10-CM

## 2023-11-07 RX ORDER — DEXTROAMPHETAMINE SACCHARATE, AMPHETAMINE ASPARTATE MONOHYDRATE, DEXTROAMPHETAMINE SULFATE AND AMPHETAMINE SULFATE 7.5; 7.5; 7.5; 7.5 MG/1; MG/1; MG/1; MG/1
30 CAPSULE, EXTENDED RELEASE ORAL EVERY MORNING
Qty: 30 CAPSULE | Refills: 0 | Status: SHIPPED | OUTPATIENT
Start: 2023-11-07 | End: 2023-12-06 | Stop reason: SDUPTHER

## 2023-12-06 DIAGNOSIS — F90.2 ATTENTION DEFICIT HYPERACTIVITY DISORDER (ADHD), COMBINED TYPE: ICD-10-CM

## 2023-12-06 RX ORDER — DEXTROAMPHETAMINE SACCHARATE, AMPHETAMINE ASPARTATE MONOHYDRATE, DEXTROAMPHETAMINE SULFATE AND AMPHETAMINE SULFATE 7.5; 7.5; 7.5; 7.5 MG/1; MG/1; MG/1; MG/1
30 CAPSULE, EXTENDED RELEASE ORAL EVERY MORNING
Qty: 30 CAPSULE | Refills: 0 | Status: SHIPPED | OUTPATIENT
Start: 2023-12-06 | End: 2024-01-23 | Stop reason: SDUPTHER

## 2024-01-23 DIAGNOSIS — F90.2 ATTENTION DEFICIT HYPERACTIVITY DISORDER (ADHD), COMBINED TYPE: ICD-10-CM

## 2024-01-23 RX ORDER — DEXTROAMPHETAMINE SACCHARATE, AMPHETAMINE ASPARTATE MONOHYDRATE, DEXTROAMPHETAMINE SULFATE AND AMPHETAMINE SULFATE 7.5; 7.5; 7.5; 7.5 MG/1; MG/1; MG/1; MG/1
30 CAPSULE, EXTENDED RELEASE ORAL EVERY MORNING
Qty: 30 CAPSULE | Refills: 0 | Status: SHIPPED | OUTPATIENT
Start: 2024-01-23 | End: 2024-02-26 | Stop reason: SDUPTHER

## 2024-01-29 ENCOUNTER — OFFICE VISIT (OUTPATIENT)
Dept: PRIMARY CARE CLINIC | Facility: CLINIC | Age: 29
End: 2024-01-29
Payer: COMMERCIAL

## 2024-01-29 VITALS
TEMPERATURE: 98 F | RESPIRATION RATE: 15 BRPM | HEART RATE: 88 BPM | BODY MASS INDEX: 22.23 KG/M2 | OXYGEN SATURATION: 98 % | SYSTOLIC BLOOD PRESSURE: 101 MMHG | HEIGHT: 68 IN | DIASTOLIC BLOOD PRESSURE: 68 MMHG | WEIGHT: 146.69 LBS

## 2024-01-29 DIAGNOSIS — F90.2 ATTENTION DEFICIT HYPERACTIVITY DISORDER (ADHD), COMBINED TYPE: Primary | ICD-10-CM

## 2024-01-29 PROCEDURE — 1160F RVW MEDS BY RX/DR IN RCRD: CPT | Mod: CPTII,S$GLB,, | Performed by: NURSE PRACTITIONER

## 2024-01-29 PROCEDURE — 3008F BODY MASS INDEX DOCD: CPT | Mod: CPTII,S$GLB,, | Performed by: NURSE PRACTITIONER

## 2024-01-29 PROCEDURE — 99214 OFFICE O/P EST MOD 30 MIN: CPT | Mod: S$GLB,,, | Performed by: NURSE PRACTITIONER

## 2024-01-29 PROCEDURE — 3074F SYST BP LT 130 MM HG: CPT | Mod: CPTII,S$GLB,, | Performed by: NURSE PRACTITIONER

## 2024-01-29 PROCEDURE — 1159F MED LIST DOCD IN RCRD: CPT | Mod: CPTII,S$GLB,, | Performed by: NURSE PRACTITIONER

## 2024-01-29 PROCEDURE — 3078F DIAST BP <80 MM HG: CPT | Mod: CPTII,S$GLB,, | Performed by: NURSE PRACTITIONER

## 2024-01-29 RX ORDER — DEXTROAMPHETAMINE SACCHARATE, AMPHETAMINE ASPARTATE, DEXTROAMPHETAMINE SULFATE AND AMPHETAMINE SULFATE 7.5; 7.5; 7.5; 7.5 MG/1; MG/1; MG/1; MG/1
30 TABLET ORAL 2 TIMES DAILY
Qty: 60 TABLET | Refills: 0 | Status: SHIPPED | OUTPATIENT
Start: 2024-02-23 | End: 2024-04-24

## 2024-01-29 NOTE — PROGRESS NOTES
"Subjective:       Patient ID: Bhavik Malone is a 29 y.o. male.    Chief Complaint: Follow-up (Pt. Here for 3mth f/u.  No c/o today.)    HPI: Bhavik is a 29 y.o. male who presents for F/U.    Feels well today with no new issues or concerns.    ADHD - currently taking Adderall 30 mg BID due to Vyvanse being out of stock. He is tolerating medication well, no s/e of CP, palpitations, syncope, actually feels much better, able to stay focused and concentrate throughout the day. Able to complete tasks within timely manner.  Denies se/ar to ADHD medication.     He does have a history of hemorrhoids which he uses preparation H when needed along with a high fiber diet.    Smokes 1/2 ppd of cigarettes daily. Refuses smoking cessation.    Refuses all vaccines.                  History reviewed. No pertinent past medical history.    History reviewed. No pertinent surgical history.    History reviewed. No pertinent family history.    Social History     Tobacco Use    Smoking status: Light Smoker     Types: Cigarettes     Passive exposure: Never    Smokeless tobacco: Former     Types: Chew   Substance Use Topics    Alcohol use: Yes     Comment: "cutting back"    Drug use: Never       Patient Active Problem List   Diagnosis    Attention deficit hyperactivity disorder (ADHD), combined type    Other hemorrhoids         There is no immunization history on file for this patient.        Review of Systems   Constitutional:  Negative for activity change, appetite change, chills, diaphoresis, fatigue and fever.   HENT:  Negative for tinnitus and trouble swallowing.    Eyes:  Negative for visual disturbance.   Respiratory:  Negative for cough, chest tightness, shortness of breath and wheezing.    Cardiovascular:  Negative for chest pain, palpitations and leg swelling.   Gastrointestinal:  Negative for abdominal distention and abdominal pain.   Genitourinary:  Negative for dysuria, frequency, hematuria and urgency.   Musculoskeletal:  " "Negative for gait problem.   Skin:  Negative for color change and rash.   Neurological:  Negative for dizziness, speech difficulty, weakness, light-headedness, numbness and headaches.   Psychiatric/Behavioral:  Negative for behavioral problems, confusion and dysphoric mood. The patient is not nervous/anxious.      Objective:     Vitals:    01/29/24 1256   BP: 101/68   BP Location: Left arm   Patient Position: Sitting   BP Method: Large (Automatic)   Pulse: 88   Resp: 15   Temp: 97.5 °F (36.4 °C)   TempSrc: Oral   SpO2: 98%   Weight: 66.5 kg (146 lb 11.2 oz)   Height: 5' 8" (1.727 m)       Physical Exam  Vitals and nursing note reviewed.   Constitutional:       General: He is not in acute distress.     Appearance: He is not diaphoretic.   HENT:      Head: Normocephalic and atraumatic.      Mouth/Throat:      Mouth: Mucous membranes are moist.      Pharynx: Oropharynx is clear.   Eyes:      Extraocular Movements: Extraocular movements intact.      Conjunctiva/sclera: Conjunctivae normal.   Neck:      Thyroid: No thyromegaly or thyroid tenderness.   Cardiovascular:      Rate and Rhythm: Normal rate and regular rhythm.      Pulses: Normal pulses.      Heart sounds: Normal heart sounds.   Pulmonary:      Effort: Pulmonary effort is normal.      Breath sounds: Normal breath sounds.   Abdominal:      General: There is no distension.      Tenderness: There is no abdominal tenderness.   Musculoskeletal:         General: Normal range of motion.      Cervical back: Normal range of motion.      Right lower leg: No edema.      Left lower leg: No edema.   Lymphadenopathy:      Cervical: No cervical adenopathy.   Skin:     General: Skin is warm and dry.   Neurological:      Mental Status: He is alert and oriented to person, place, and time.   Psychiatric:         Mood and Affect: Mood and affect normal.         Behavior: Behavior normal. Behavior is cooperative.         Thought Content: Thought content normal.         Judgment: " Judgment normal.         No visits with results within 6 Month(s) from this visit.   Latest known visit with results is:   No results found for any previous visit.         Assessment:      1. Attention deficit hyperactivity disorder (ADHD), combined type Well controlled         Plan:     Attention deficit hyperactivity disorder (ADHD), combined type  Comments:  Adderall refilled, F/U in 3 months  Orders:  -     dextroamphetamine-amphetamine 30 mg Tab; Take 1 tablet (30 mg total) by mouth 2 (two) times daily.  Dispense: 60 tablet; Refill: 0         Current Outpatient Medications   Medication Sig Dispense Refill    dextroamphetamine-amphetamine (ADDERALL XR) 30 MG 24 hr capsule Take 1 capsule (30 mg total) by mouth every morning. 30 capsule 0    [START ON 2/23/2024] dextroamphetamine-amphetamine 30 mg Tab Take 1 tablet (30 mg total) by mouth 2 (two) times daily. 60 tablet 0     No current facility-administered medications for this visit.       Medications Discontinued During This Encounter   Medication Reason    lisdexamfetamine (VYVANSE) 50 MG capsule Cost of medication    lisdexamfetamine (VYVANSE) 50 MG capsule Cost of medication    lisdexamfetamine (VYVANSE) 50 MG capsule Cost of medication       Health Maintenance   Topic Date Due    Hepatitis C Screening  Never done    Lipid Panel  Never done    TETANUS VACCINE  Never done       Patient Instructions   RTC in 3 months for F/U or sooner if needed.    Patient Education       Attention Deficit Hyperactivity Disorder (ADHD) Discharge Instructions   About this topic   Attention deficit hyperactivity disorder is also called ADHD or ADD. Most often, this starts at a young age. This disorder makes it hard to focus or sit still. People with ADHD may find it hard to make good decisions. Children with ADHD may have trouble in school and at home. Adults may have problems at work. People with ADHD may also have a problem getting along well with others. While there is no cure  for ADHD, you and your doctor can work on a plan that is best for you to treat the signs of ADHD.  What care is needed at home?   Ask your doctor what you need to do when you go home. Make sure you ask questions if you do not understand what the doctor says. This way you will know what you need to do.  Try to get enough sleep at night. Most often adults need 7 to 8 hours each night and children need 8 to 10 hours each night. Rest during the day if you are tired.  Eat and sleep at the same times every day.  Have a plan for where to keep things in your home. Use checklists, things to help you remember, and alarms. A list of things you may need to find in a hurry can be helpful. These can help you put things in the right place and manage your time.  Work on getting better at reading and taking notes.  Have a space that is just for work or homework so you will be able to pay attention. This may be an office or a desk facing a wall. Try using headphones so you cannot hear the other noises.  Do one thing at a time. Keep a list of the next things you were planning to do or say.  Break large jobs or things you have to do into smaller jobs. This will make them easier to finish. Reward yourself along the way.  Have rules that are clear and easy to follow.   Look at where you are the strongest. Give rewards for good behavior, finished schoolwork, or for doing a good job at work.  Do not do too many things that might cause stress.  What follow-up care is needed?   Your doctor may ask you to make visits to the office to check on your progress. Be sure to keep these visits.  Your doctor may send you to a special mental health doctor. This person will talk with you about the problems you are having. Then, you can work together to find ways to help you manage them.  Your doctor may suggest you try talk therapy to help you live well with your ADHD.  What drugs may be needed?   The doctor may order drugs to:  Help lower your  worry  Help you to pay attention  Help you be more calm  Help you be less impulsive  Help keep things in your life balanced  Care for low mood  Will physical activity be limited?   Physical activity will help keep your mind and body in good shape. Talk with your doctor about the right amount of activity for you.  What problems could happen?   Feeling badly about yourself  Raise the chances of you hurting yourself, such as injury in a car accident  Not do well in school and work  Trouble making friends or getting along well with others  Raise your chance to abuse alcohol or drugs  What can be done to prevent this health problem?   The cause of ADHD is not clear, but to lower the chance of your child having ADHD:  Do not drink beer, wine, or mixed drinks (alcohol) while you are pregnant.  Do not smoke or use illegal drugs while you are pregnant.  Keep your child away from things like harmful toxins and chemicals.  Keep your child from having too much time in front of computers, TV, and video games.  Get plenty of exercise.  Be more aware of thoughts, emotions, and experiences each moment. This is mindfulness.  When do I need to call the doctor?   Drugs are not working  Symptoms are getting worse  Teach Back: Helping You Understand   The Teach Back Method helps you understand the information we are giving you. After you talk with the staff, tell them in your own words what you learned. This helps to make sure the staff has described each thing clearly. It also helps to explain things that may have been confusing. Before going home, make sure you can do these:  I can tell you about my condition.  I can tell you ways to help me pay attention.  I can tell you what I will do if I think my drugs are not working.  Where can I learn more?   HelpGuide.org  http://www.helpguide.org/articles/add-adhd/attention-deficit-disorder-adhd-parenting-tips.htm   KidsHealth  http://kidshealth.org/parent/medical/learning/adhd.html    National [place] of Mental Health  http://www.nimh.nih.gov/health/topics/attention-deficit-hyperactivity-disorder-adhd/index.shtml   Last Reviewed Date   2020-06-14  Consumer Information Use and Disclaimer   This information is not specific medical advice and does not replace information you receive from your health care provider. This is only a brief summary of general information. It does NOT include all information about conditions, illnesses, injuries, tests, procedures, treatments, therapies, discharge instructions or life-style choices that may apply to you. You must talk with your health care provider for complete information about your health and treatment options. This information should not be used to decide whether or not to accept your health care providers advice, instructions or recommendations. Only your health care provider has the knowledge and training to provide advice that is right for you.  Copyright   Copyright © 2021 [employer] and its affiliates and/or licensors. All rights reserved.      Risks, benefits, and alternatives discussed with patient, Patient verbalized understanding of discussed plan of care. Asked patient if any further questions, answered no.    Future Appointments   Date Time Provider Department Center   5/6/2024  3:40 PM Benny Shelton NP [facility]              Benny Shelton NP

## 2024-01-29 NOTE — PATIENT INSTRUCTIONS
RTC in 3 months for F/U or sooner if needed.    Patient Education       Attention Deficit Hyperactivity Disorder (ADHD) Discharge Instructions   About this topic   Attention deficit hyperactivity disorder is also called ADHD or ADD. Most often, this starts at a young age. This disorder makes it hard to focus or sit still. People with ADHD may find it hard to make good decisions. Children with ADHD may have trouble in school and at home. Adults may have problems at work. People with ADHD may also have a problem getting along well with others. While there is no cure for ADHD, you and your doctor can work on a plan that is best for you to treat the signs of ADHD.  What care is needed at home?   Ask your doctor what you need to do when you go home. Make sure you ask questions if you do not understand what the doctor says. This way you will know what you need to do.  Try to get enough sleep at night. Most often adults need 7 to 8 hours each night and children need 8 to 10 hours each night. Rest during the day if you are tired.  Eat and sleep at the same times every day.  Have a plan for where to keep things in your home. Use checklists, things to help you remember, and alarms. A list of things you may need to find in a hurry can be helpful. These can help you put things in the right place and manage your time.  Work on getting better at reading and taking notes.  Have a space that is just for work or homework so you will be able to pay attention. This may be an office or a desk facing a wall. Try using headphones so you cannot hear the other noises.  Do one thing at a time. Keep a list of the next things you were planning to do or say.  Break large jobs or things you have to do into smaller jobs. This will make them easier to finish. Reward yourself along the way.  Have rules that are clear and easy to follow.   Look at where you are the strongest. Give rewards for good behavior, finished schoolwork, or for doing a good  job at work.  Do not do too many things that might cause stress.  What follow-up care is needed?   Your doctor may ask you to make visits to the office to check on your progress. Be sure to keep these visits.  Your doctor may send you to a special mental health doctor. This person will talk with you about the problems you are having. Then, you can work together to find ways to help you manage them.  Your doctor may suggest you try talk therapy to help you live well with your ADHD.  What drugs may be needed?   The doctor may order drugs to:  Help lower your worry  Help you to pay attention  Help you be more calm  Help you be less impulsive  Help keep things in your life balanced  Care for low mood  Will physical activity be limited?   Physical activity will help keep your mind and body in good shape. Talk with your doctor about the right amount of activity for you.  What problems could happen?   Feeling badly about yourself  Raise the chances of you hurting yourself, such as injury in a car accident  Not do well in school and work  Trouble making friends or getting along well with others  Raise your chance to abuse alcohol or drugs  What can be done to prevent this health problem?   The cause of ADHD is not clear, but to lower the chance of your child having ADHD:  Do not drink beer, wine, or mixed drinks (alcohol) while you are pregnant.  Do not smoke or use illegal drugs while you are pregnant.  Keep your child away from things like harmful toxins and chemicals.  Keep your child from having too much time in front of computers, TV, and video games.  Get plenty of exercise.  Be more aware of thoughts, emotions, and experiences each moment. This is mindfulness.  When do I need to call the doctor?   Drugs are not working  Symptoms are getting worse  Teach Back: Helping You Understand   The Teach Back Method helps you understand the information we are giving you. After you talk with the staff, tell them in your own  words what you learned. This helps to make sure the staff has described each thing clearly. It also helps to explain things that may have been confusing. Before going home, make sure you can do these:  I can tell you about my condition.  I can tell you ways to help me pay attention.  I can tell you what I will do if I think my drugs are not working.  Where can I learn more?   HelpGuide.org  http://www.helpguide.org/articles/add-adhd/attention-deficit-disorder-adhd-parenting-tips.htm   KidsHealth  http://kidshealth.org/parent/medical/learning/adhd.html   National [place] of Mental Health  http://www.[facility].nih.gov/health/topics/attention-deficit-hyperactivity-disorder-adhd/index.shtml   Last Reviewed Date   2020-06-14  Consumer Information Use and Disclaimer   This information is not specific medical advice and does not replace information you receive from your health care provider. This is only a brief summary of general information. It does NOT include all information about conditions, illnesses, injuries, tests, procedures, treatments, therapies, discharge instructions or life-style choices that may apply to you. You must talk with your health care provider for complete information about your health and treatment options. This information should not be used to decide whether or not to accept your health care providers advice, instructions or recommendations. Only your health care provider has the knowledge and training to provide advice that is right for you.  Copyright   Copyright © 2021 [employer] and its affiliates and/or licensors. All rights reserved.

## 2024-02-26 DIAGNOSIS — F90.2 ATTENTION DEFICIT HYPERACTIVITY DISORDER (ADHD), COMBINED TYPE: ICD-10-CM

## 2024-02-27 RX ORDER — DEXTROAMPHETAMINE SACCHARATE, AMPHETAMINE ASPARTATE MONOHYDRATE, DEXTROAMPHETAMINE SULFATE AND AMPHETAMINE SULFATE 7.5; 7.5; 7.5; 7.5 MG/1; MG/1; MG/1; MG/1
30 CAPSULE, EXTENDED RELEASE ORAL EVERY MORNING
Qty: 30 CAPSULE | Refills: 0 | Status: SHIPPED | OUTPATIENT
Start: 2024-02-27 | End: 2024-04-24 | Stop reason: ALTCHOICE

## 2024-04-24 DIAGNOSIS — F90.2 ATTENTION DEFICIT HYPERACTIVITY DISORDER (ADHD), COMBINED TYPE: ICD-10-CM

## 2024-04-24 RX ORDER — DEXTROAMPHETAMINE SACCHARATE, AMPHETAMINE ASPARTATE MONOHYDRATE, DEXTROAMPHETAMINE SULFATE AND AMPHETAMINE SULFATE 7.5; 7.5; 7.5; 7.5 MG/1; MG/1; MG/1; MG/1
30 CAPSULE, EXTENDED RELEASE ORAL EVERY MORNING
Qty: 30 CAPSULE | Refills: 0 | Status: CANCELLED | OUTPATIENT
Start: 2024-04-24

## 2024-04-24 RX ORDER — DEXTROAMPHETAMINE SACCHARATE, AMPHETAMINE ASPARTATE, DEXTROAMPHETAMINE SULFATE AND AMPHETAMINE SULFATE 7.5; 7.5; 7.5; 7.5 MG/1; MG/1; MG/1; MG/1
30 TABLET ORAL 2 TIMES DAILY
Qty: 60 TABLET | Refills: 0 | Status: SHIPPED | OUTPATIENT
Start: 2024-04-24 | End: 2024-05-23 | Stop reason: SDUPTHER

## 2024-04-24 NOTE — TELEPHONE ENCOUNTER
----- Message from Tosin Thibodeaux sent at 4/24/2024 12:02 PM CDT -----  Contact: Bhavik  Type:  RX Refill Request    Who Called: Bhavik  Refill or New Rx:refill  RX Name and Strength:dextroamphetamine-amphetamine 30 mg Tab   How is the patient currently taking it? (ex. 1XDay):Twice daily  Is this a 30 day or 90 day RX:30  Preferred Pharmacy with phone number:  [id number] [place] [id number] [address] [address]  Phone: [phone] Fax: [phone]  Local or Mail Order:local  Ordering Provider:Latha Shelotn  Would the patient rather a call back or a response via [facility]? Call  Best Call Back Number:[phone]   Additional Information: Patient request prescription refill. Please give patient a call back to notify when sent.   Thank you,  [facility]

## 2024-05-23 DIAGNOSIS — F90.2 ATTENTION DEFICIT HYPERACTIVITY DISORDER (ADHD), COMBINED TYPE: ICD-10-CM

## 2024-05-23 RX ORDER — DEXTROAMPHETAMINE SACCHARATE, AMPHETAMINE ASPARTATE, DEXTROAMPHETAMINE SULFATE AND AMPHETAMINE SULFATE 7.5; 7.5; 7.5; 7.5 MG/1; MG/1; MG/1; MG/1
30 TABLET ORAL 2 TIMES DAILY
Qty: 60 TABLET | Refills: 0 | Status: SHIPPED | OUTPATIENT
Start: 2024-05-23

## 2024-07-08 DIAGNOSIS — F90.2 ATTENTION DEFICIT HYPERACTIVITY DISORDER (ADHD), COMBINED TYPE: ICD-10-CM

## 2024-07-08 RX ORDER — DEXTROAMPHETAMINE SACCHARATE, AMPHETAMINE ASPARTATE, DEXTROAMPHETAMINE SULFATE AND AMPHETAMINE SULFATE 7.5; 7.5; 7.5; 7.5 MG/1; MG/1; MG/1; MG/1
30 TABLET ORAL 2 TIMES DAILY
Qty: 60 TABLET | Refills: 0 | Status: SHIPPED | OUTPATIENT
Start: 2024-07-08

## 2024-07-08 NOTE — TELEPHONE ENCOUNTER
----- Message from Marycarmen Baca sent at 7/8/2024 12:02 PM CDT -----  Name of Who is Calling:pt           What is the request in detail:requesting refill for adderall 30mg      [facility] #[id number] - [address] [address]  Phone: [phone] Fax: [phone]        Can the clinic reply by [facility]:no           What Number to [facility] Back [facility]: [phone]

## 2024-08-12 DIAGNOSIS — F90.2 ATTENTION DEFICIT HYPERACTIVITY DISORDER (ADHD), COMBINED TYPE: ICD-10-CM

## 2024-08-12 RX ORDER — DEXTROAMPHETAMINE SACCHARATE, AMPHETAMINE ASPARTATE, DEXTROAMPHETAMINE SULFATE AND AMPHETAMINE SULFATE 7.5; 7.5; 7.5; 7.5 MG/1; MG/1; MG/1; MG/1
30 TABLET ORAL 2 TIMES DAILY
Qty: 60 TABLET | Refills: 0 | OUTPATIENT
Start: 2024-08-12

## 2024-11-27 DIAGNOSIS — Z13.220 SCREENING FOR CHOLESTEROL LEVEL: ICD-10-CM

## 2024-11-27 DIAGNOSIS — F90.2 ATTENTION DEFICIT HYPERACTIVITY DISORDER (ADHD), COMBINED TYPE: Primary | ICD-10-CM

## 2024-11-27 DIAGNOSIS — Z13.29 THYROID DISORDER SCREEN: ICD-10-CM

## 2024-11-27 DIAGNOSIS — Z13.1 DIABETES MELLITUS SCREENING: ICD-10-CM

## 2024-11-27 DIAGNOSIS — E55.9 VITAMIN D DEFICIENCY: ICD-10-CM

## 2024-11-27 DIAGNOSIS — Z11.4 SCREENING FOR HIV (HUMAN IMMUNODEFICIENCY VIRUS): ICD-10-CM

## 2024-11-27 DIAGNOSIS — Z11.59 NEED FOR HEPATITIS C SCREENING TEST: ICD-10-CM

## 2024-11-27 DIAGNOSIS — Z00.00 ANNUAL PHYSICAL EXAM: ICD-10-CM

## 2025-07-10 ENCOUNTER — WALK IN (OUTPATIENT)
Dept: URGENT CARE | Age: 30
End: 2025-07-10

## 2025-07-10 VITALS
TEMPERATURE: 98.7 F | RESPIRATION RATE: 16 BRPM | DIASTOLIC BLOOD PRESSURE: 86 MMHG | HEART RATE: 96 BPM | OXYGEN SATURATION: 100 % | SYSTOLIC BLOOD PRESSURE: 130 MMHG

## 2025-07-10 DIAGNOSIS — H60.331 ACUTE SWIMMER'S EAR OF RIGHT SIDE: ICD-10-CM

## 2025-07-10 DIAGNOSIS — H66.001 NON-RECURRENT ACUTE SUPPURATIVE OTITIS MEDIA OF RIGHT EAR WITHOUT SPONTANEOUS RUPTURE OF TYMPANIC MEMBRANE: Primary | ICD-10-CM

## 2025-07-10 PROCEDURE — 99203 OFFICE O/P NEW LOW 30 MIN: CPT | Performed by: PHYSICIAN ASSISTANT

## 2025-07-10 RX ORDER — CIPROFLOXACIN AND DEXAMETHASONE 3; 1 MG/ML; MG/ML
4 SUSPENSION/ DROPS AURICULAR (OTIC) 2 TIMES DAILY
Qty: 7.5 ML | Refills: 0 | Status: SHIPPED | OUTPATIENT
Start: 2025-07-10 | End: 2025-07-17

## 2025-07-10 RX ORDER — AMOXICILLIN 875 MG/1
875 TABLET, COATED ORAL 2 TIMES DAILY
Qty: 20 TABLET | Refills: 0 | Status: SHIPPED | OUTPATIENT
Start: 2025-07-10 | End: 2025-07-20